# Patient Record
Sex: MALE | Race: WHITE | NOT HISPANIC OR LATINO | URBAN - METROPOLITAN AREA
[De-identification: names, ages, dates, MRNs, and addresses within clinical notes are randomized per-mention and may not be internally consistent; named-entity substitution may affect disease eponyms.]

---

## 2017-05-09 ENCOUNTER — RECORDS - HEALTHEAST (OUTPATIENT)
Dept: LAB | Facility: CLINIC | Age: 32
End: 2017-05-09

## 2017-05-09 LAB
CHOLEST SERPL-MCNC: 189 MG/DL
FASTING STATUS PATIENT QL REPORTED: ABNORMAL
HDLC SERPL-MCNC: 40 MG/DL
LDLC SERPL CALC-MCNC: 130 MG/DL
TRIGL SERPL-MCNC: 97 MG/DL

## 2017-11-09 ENCOUNTER — RECORDS - HEALTHEAST (OUTPATIENT)
Dept: ADMINISTRATIVE | Facility: OTHER | Age: 32
End: 2017-11-09

## 2017-11-09 ENCOUNTER — COMMUNICATION - HEALTHEAST (OUTPATIENT)
Dept: SURGERY | Facility: CLINIC | Age: 32
End: 2017-11-09

## 2017-11-10 ENCOUNTER — AMBULATORY - HEALTHEAST (OUTPATIENT)
Dept: SURGERY | Facility: CLINIC | Age: 32
End: 2017-11-10

## 2017-11-10 DIAGNOSIS — E66.01 MORBID OBESITY (H): ICD-10-CM

## 2017-12-20 ENCOUNTER — OFFICE VISIT - HEALTHEAST (OUTPATIENT)
Dept: SURGERY | Facility: CLINIC | Age: 32
End: 2017-12-20

## 2017-12-20 ENCOUNTER — AMBULATORY - HEALTHEAST (OUTPATIENT)
Dept: LAB | Facility: CLINIC | Age: 32
End: 2017-12-20

## 2017-12-20 DIAGNOSIS — E66.01 OBESITY, CLASS III, BMI 40-49.9 (MORBID OBESITY) (H): ICD-10-CM

## 2017-12-20 DIAGNOSIS — R53.83 FATIGUE: ICD-10-CM

## 2017-12-20 DIAGNOSIS — K76.0 HEPATIC STEATOSIS: ICD-10-CM

## 2017-12-20 LAB — HBA1C MFR BLD: 5.4 % (ref 3.5–6)

## 2017-12-20 ASSESSMENT — MIFFLIN-ST. JEOR: SCORE: 2660.94

## 2018-01-30 ENCOUNTER — OFFICE VISIT - HEALTHEAST (OUTPATIENT)
Dept: SURGERY | Facility: CLINIC | Age: 33
End: 2018-01-30

## 2018-01-30 DIAGNOSIS — Z71.3 NUTRITIONAL COUNSELING: ICD-10-CM

## 2018-01-30 DIAGNOSIS — E66.01 OBESITY, MORBID, BMI 40.0-49.9 (H): ICD-10-CM

## 2018-01-30 ASSESSMENT — MIFFLIN-ST. JEOR: SCORE: 2600.02

## 2018-02-09 ENCOUNTER — OFFICE VISIT - HEALTHEAST (OUTPATIENT)
Dept: SURGERY | Facility: CLINIC | Age: 33
End: 2018-02-09

## 2018-02-09 DIAGNOSIS — K76.0 HEPATIC STEATOSIS: ICD-10-CM

## 2018-02-09 DIAGNOSIS — E66.01 OBESITY, CLASS III, BMI 40-49.9 (MORBID OBESITY) (H): ICD-10-CM

## 2018-02-09 DIAGNOSIS — G47.33 OSA ON CPAP: ICD-10-CM

## 2018-02-09 RX ORDER — ZOLPIDEM TARTRATE 10 MG/1
TABLET ORAL PRN
Refills: 1 | Status: SHIPPED | COMMUNITY
Start: 2018-01-15

## 2018-02-09 ASSESSMENT — MIFFLIN-ST. JEOR: SCORE: 2605.92

## 2018-03-06 ENCOUNTER — OFFICE VISIT - HEALTHEAST (OUTPATIENT)
Dept: SURGERY | Facility: CLINIC | Age: 33
End: 2018-03-06

## 2018-03-06 DIAGNOSIS — Z71.3 NUTRITIONAL COUNSELING: ICD-10-CM

## 2018-03-06 DIAGNOSIS — E66.01 OBESITY, MORBID, BMI 40.0-49.9 (H): ICD-10-CM

## 2018-03-06 ASSESSMENT — MIFFLIN-ST. JEOR: SCORE: 2565.09

## 2018-03-30 ENCOUNTER — COMMUNICATION - HEALTHEAST (OUTPATIENT)
Dept: SURGERY | Facility: CLINIC | Age: 33
End: 2018-03-30

## 2018-04-27 ENCOUNTER — COMMUNICATION - HEALTHEAST (OUTPATIENT)
Dept: SURGERY | Facility: CLINIC | Age: 33
End: 2018-04-27

## 2018-05-08 ENCOUNTER — RECORDS - HEALTHEAST (OUTPATIENT)
Dept: LAB | Facility: CLINIC | Age: 33
End: 2018-05-08

## 2018-05-08 LAB
ALBUMIN SERPL-MCNC: 3.8 G/DL (ref 3.5–5)
ALP SERPL-CCNC: 57 U/L (ref 45–120)
ALT SERPL W P-5'-P-CCNC: 41 U/L (ref 0–45)
AST SERPL W P-5'-P-CCNC: 30 U/L (ref 0–40)
BILIRUB DIRECT SERPL-MCNC: 0.3 MG/DL
BILIRUB SERPL-MCNC: 1.2 MG/DL (ref 0–1)
PROT SERPL-MCNC: 7.4 G/DL (ref 6–8)

## 2018-09-12 ENCOUNTER — OFFICE VISIT - HEALTHEAST (OUTPATIENT)
Dept: SURGERY | Facility: CLINIC | Age: 33
End: 2018-09-12

## 2018-09-12 DIAGNOSIS — E66.01 OBESITY, CLASS III, BMI 40-49.9 (MORBID OBESITY) (H): ICD-10-CM

## 2018-09-12 DIAGNOSIS — G25.81 RESTLESS LEGS SYNDROME: ICD-10-CM

## 2018-09-12 DIAGNOSIS — G47.33 OSA ON CPAP: ICD-10-CM

## 2018-09-12 DIAGNOSIS — K76.0 HEPATIC STEATOSIS: ICD-10-CM

## 2018-09-12 RX ORDER — GABAPENTIN 300 MG/1
3 CAPSULE ORAL AT BEDTIME
Status: SHIPPED | COMMUNITY
Start: 2018-06-25

## 2018-09-12 ASSESSMENT — MIFFLIN-ST. JEOR: SCORE: 2600.02

## 2018-09-27 ENCOUNTER — OFFICE VISIT - HEALTHEAST (OUTPATIENT)
Dept: SURGERY | Facility: CLINIC | Age: 33
End: 2018-09-27

## 2018-09-27 DIAGNOSIS — Z71.3 NUTRITIONAL COUNSELING: ICD-10-CM

## 2018-09-27 DIAGNOSIS — G47.33 OSA ON CPAP: ICD-10-CM

## 2018-09-27 DIAGNOSIS — E66.01 OBESITY, MORBID, BMI 40.0-49.9 (H): ICD-10-CM

## 2018-09-27 ASSESSMENT — MIFFLIN-ST. JEOR: SCORE: 2613.63

## 2018-10-31 ENCOUNTER — OFFICE VISIT - HEALTHEAST (OUTPATIENT)
Dept: SURGERY | Facility: CLINIC | Age: 33
End: 2018-10-31

## 2018-10-31 DIAGNOSIS — F17.200 NICOTINE DEPENDENCE: ICD-10-CM

## 2018-10-31 DIAGNOSIS — K76.0 HEPATIC STEATOSIS: ICD-10-CM

## 2018-10-31 DIAGNOSIS — G47.33 OSA ON CPAP: ICD-10-CM

## 2018-10-31 DIAGNOSIS — E66.01 OBESITY, CLASS III, BMI 40-49.9 (MORBID OBESITY) (H): ICD-10-CM

## 2018-10-31 ASSESSMENT — MIFFLIN-ST. JEOR: SCORE: 2596.39

## 2018-11-05 ENCOUNTER — RECORDS - HEALTHEAST (OUTPATIENT)
Dept: LAB | Facility: CLINIC | Age: 33
End: 2018-11-05

## 2018-11-05 LAB
ANION GAP SERPL CALCULATED.3IONS-SCNC: 9 MMOL/L (ref 5–18)
BUN SERPL-MCNC: 9 MG/DL (ref 8–22)
CALCIUM SERPL-MCNC: 9.5 MG/DL (ref 8.5–10.5)
CHLORIDE BLD-SCNC: 105 MMOL/L (ref 98–107)
CO2 SERPL-SCNC: 26 MMOL/L (ref 22–31)
CREAT SERPL-MCNC: 0.72 MG/DL (ref 0.7–1.3)
GFR SERPL CREATININE-BSD FRML MDRD: >60 ML/MIN/1.73M2
GLUCOSE BLD-MCNC: 78 MG/DL (ref 70–125)
MAGNESIUM SERPL-MCNC: 2.3 MG/DL (ref 1.8–2.6)
POTASSIUM BLD-SCNC: 4.2 MMOL/L (ref 3.5–5)
SODIUM SERPL-SCNC: 140 MMOL/L (ref 136–145)

## 2018-11-14 ENCOUNTER — COMMUNICATION - HEALTHEAST (OUTPATIENT)
Dept: SURGERY | Facility: CLINIC | Age: 33
End: 2018-11-14

## 2019-01-18 ENCOUNTER — OFFICE VISIT - HEALTHEAST (OUTPATIENT)
Dept: SURGERY | Facility: CLINIC | Age: 34
End: 2019-01-18

## 2019-01-18 DIAGNOSIS — E66.01 OBESITY, CLASS III, BMI 40-49.9 (MORBID OBESITY) (H): ICD-10-CM

## 2019-01-18 DIAGNOSIS — K76.0 HEPATIC STEATOSIS: ICD-10-CM

## 2019-01-18 DIAGNOSIS — G47.33 OSA ON CPAP: ICD-10-CM

## 2019-01-18 ASSESSMENT — MIFFLIN-ST. JEOR: SCORE: 2544.68

## 2019-04-04 ENCOUNTER — COMMUNICATION - HEALTHEAST (OUTPATIENT)
Dept: SURGERY | Facility: CLINIC | Age: 34
End: 2019-04-04

## 2019-09-26 ENCOUNTER — AMBULATORY - HEALTHEAST (OUTPATIENT)
Dept: NURSING | Facility: CLINIC | Age: 34
End: 2019-09-26

## 2019-10-28 ENCOUNTER — COMMUNICATION - HEALTHEAST (OUTPATIENT)
Dept: SURGERY | Facility: CLINIC | Age: 34
End: 2019-10-28

## 2019-11-01 ENCOUNTER — OFFICE VISIT - HEALTHEAST (OUTPATIENT)
Dept: SURGERY | Facility: CLINIC | Age: 34
End: 2019-11-01

## 2019-11-01 DIAGNOSIS — G47.33 OSA ON CPAP: ICD-10-CM

## 2019-11-01 DIAGNOSIS — E66.01 OBESITY, CLASS III, BMI 40-49.9 (MORBID OBESITY) (H): ICD-10-CM

## 2019-11-01 RX ORDER — PHENTERMINE HYDROCHLORIDE 37.5 MG/1
TABLET ORAL
Qty: 90 TABLET | Refills: 0 | Status: SHIPPED | OUTPATIENT
Start: 2019-11-01

## 2019-11-01 ASSESSMENT — MIFFLIN-ST. JEOR: SCORE: 2627.24

## 2019-12-03 ENCOUNTER — RECORDS - HEALTHEAST (OUTPATIENT)
Dept: LAB | Facility: CLINIC | Age: 34
End: 2019-12-03

## 2019-12-03 LAB
ALBUMIN SERPL-MCNC: 3.4 G/DL (ref 3.5–5)
ALP SERPL-CCNC: 62 U/L (ref 45–120)
ALT SERPL W P-5'-P-CCNC: 70 U/L (ref 0–45)
ANION GAP SERPL CALCULATED.3IONS-SCNC: 7 MMOL/L (ref 5–18)
AST SERPL W P-5'-P-CCNC: 64 U/L (ref 0–40)
BILIRUB SERPL-MCNC: 1.2 MG/DL (ref 0–1)
BUN SERPL-MCNC: 8 MG/DL (ref 8–22)
CALCIUM SERPL-MCNC: 8.2 MG/DL (ref 8.5–10.5)
CHLORIDE BLD-SCNC: 105 MMOL/L (ref 98–107)
CO2 SERPL-SCNC: 27 MMOL/L (ref 22–31)
CREAT SERPL-MCNC: 0.75 MG/DL (ref 0.7–1.3)
GFR SERPL CREATININE-BSD FRML MDRD: >60 ML/MIN/1.73M2
GLUCOSE BLD-MCNC: 91 MG/DL (ref 70–125)
LIPASE SERPL-CCNC: 36 U/L (ref 0–52)
POTASSIUM BLD-SCNC: 3.8 MMOL/L (ref 3.5–5)
PROT SERPL-MCNC: 6.9 G/DL (ref 6–8)
SODIUM SERPL-SCNC: 139 MMOL/L (ref 136–145)

## 2019-12-17 ENCOUNTER — RECORDS - HEALTHEAST (OUTPATIENT)
Dept: LAB | Facility: CLINIC | Age: 34
End: 2019-12-17

## 2019-12-17 LAB
ALBUMIN SERPL-MCNC: 3.9 G/DL (ref 3.5–5)
ALP SERPL-CCNC: 54 U/L (ref 45–120)
ALT SERPL W P-5'-P-CCNC: 70 U/L (ref 0–45)
ANION GAP SERPL CALCULATED.3IONS-SCNC: 7 MMOL/L (ref 5–18)
AST SERPL W P-5'-P-CCNC: 56 U/L (ref 0–40)
BILIRUB SERPL-MCNC: 1.1 MG/DL (ref 0–1)
BUN SERPL-MCNC: 10 MG/DL (ref 8–22)
CALCIUM SERPL-MCNC: 8.6 MG/DL (ref 8.5–10.5)
CHLORIDE BLD-SCNC: 107 MMOL/L (ref 98–107)
CO2 SERPL-SCNC: 26 MMOL/L (ref 22–31)
CREAT SERPL-MCNC: 0.78 MG/DL (ref 0.7–1.3)
GFR SERPL CREATININE-BSD FRML MDRD: >60 ML/MIN/1.73M2
GLUCOSE BLD-MCNC: 93 MG/DL (ref 70–125)
POTASSIUM BLD-SCNC: 4.2 MMOL/L (ref 3.5–5)
PROT SERPL-MCNC: 7.5 G/DL (ref 6–8)
SODIUM SERPL-SCNC: 140 MMOL/L (ref 136–145)

## 2021-01-29 ENCOUNTER — RECORDS - HEALTHEAST (OUTPATIENT)
Dept: LAB | Facility: CLINIC | Age: 36
End: 2021-01-29

## 2021-01-29 LAB
ALBUMIN SERPL-MCNC: 4.2 G/DL (ref 3.5–5)
ALP SERPL-CCNC: 54 U/L (ref 45–120)
ALT SERPL W P-5'-P-CCNC: 42 U/L (ref 0–45)
ANION GAP SERPL CALCULATED.3IONS-SCNC: 8 MMOL/L (ref 5–18)
AST SERPL W P-5'-P-CCNC: 28 U/L (ref 0–40)
BILIRUB SERPL-MCNC: 0.8 MG/DL (ref 0–1)
BUN SERPL-MCNC: 11 MG/DL (ref 8–22)
CALCIUM SERPL-MCNC: 8.8 MG/DL (ref 8.5–10.5)
CHLORIDE BLD-SCNC: 107 MMOL/L (ref 98–107)
CHOLEST SERPL-MCNC: 184 MG/DL
CO2 SERPL-SCNC: 26 MMOL/L (ref 22–31)
CREAT SERPL-MCNC: 0.79 MG/DL (ref 0.7–1.3)
FASTING STATUS PATIENT QL REPORTED: YES
GFR SERPL CREATININE-BSD FRML MDRD: >60 ML/MIN/1.73M2
GLUCOSE BLD-MCNC: 94 MG/DL (ref 70–125)
HDLC SERPL-MCNC: 36 MG/DL
LDLC SERPL CALC-MCNC: 125 MG/DL
POTASSIUM BLD-SCNC: 4.4 MMOL/L (ref 3.5–5)
PROT SERPL-MCNC: 7.4 G/DL (ref 6–8)
SODIUM SERPL-SCNC: 141 MMOL/L (ref 136–145)
TRIGL SERPL-MCNC: 117 MG/DL

## 2021-05-31 VITALS — BODY MASS INDEX: 40.43 KG/M2 | HEIGHT: 74 IN | WEIGHT: 315 LBS

## 2021-05-31 VITALS — HEIGHT: 74 IN | WEIGHT: 315 LBS | BODY MASS INDEX: 40.43 KG/M2

## 2021-06-01 VITALS — WEIGHT: 315 LBS | HEIGHT: 74 IN | BODY MASS INDEX: 40.43 KG/M2

## 2021-06-01 VITALS — HEIGHT: 74 IN | BODY MASS INDEX: 40.43 KG/M2 | WEIGHT: 315 LBS

## 2021-06-02 VITALS — WEIGHT: 315 LBS | BODY MASS INDEX: 40.43 KG/M2 | HEIGHT: 74 IN

## 2021-06-02 VITALS — HEIGHT: 74 IN | WEIGHT: 315 LBS | BODY MASS INDEX: 40.43 KG/M2

## 2021-06-02 VITALS — BODY MASS INDEX: 40.43 KG/M2 | HEIGHT: 74 IN | WEIGHT: 315 LBS

## 2021-06-02 NOTE — PROGRESS NOTES
"Bariatric Clinic Follow-Up Visit:    Marv uGy is a 34 y.o.  male with There is no height or weight on file to calculate BMI.  presenting here today for follow-up on non-surgical efforts for weight loss. Original Intake visit occurred on 9/12/18 with a weight of 354 lbs and BMI of 45.5.  Along with diet and behavior changes, he has been using phentermine in the past to assist his weight loss goals.  See his intake visit notes for details on identified contributors to weight gain in the past.  He's last been seen about 10 months ago (was due to have a new child in March) in January and requested a refill of phentermine recently but since it'd been over 6 months from our last visit is following up today for reassessment and possible re-initiation of medication. He's been using CPAP in the past for JARROD, wasn't interested in surgery in the past, had low vitamin D levels.  He'd had a RMR of 2622kcal and protein goal of 95-170g/day of protein.  Weight reduction was further of interest due to history of fatty liver disease with previous transaminase elevations in 2017 (improved last check in May 2018).    Weight:   Wt Readings from Last 2 Encounters:   01/18/19 (!) 342 lb 12.8 oz (155.5 kg)   10/31/18 (!) 354 lb 3.2 oz (160.7 kg)    pounds  Height: 6' 2\" (1.88 m) (1/18/2019  1:45 PM)  Weight: (!) 342 lb 12.8 oz (155.5 kg) (1/18/2019  1:45 PM)  BMI (Calculated): 44 (1/18/2019  1:45 PM)  SpO2: 95 % (1/18/2019  1:45 PM)      Comorbidities:  Patient Active Problem List   Diagnosis     JARROD on CPAP     Hepatic steatosis     Obesity, Class III, BMI 40-49.9 (morbid obesity) (H)       Current Outpatient Medications:      gabapentin (NEURONTIN) 300 MG capsule, Take 3 capsules by mouth at bedtime., Disp: , Rfl:      phentermine (ADIPEX-P) 37.5 mg tablet, Half tablet daily, Disp: 60 tablet, Rfl: 0     zolpidem (AMBIEN) 10 mg tablet, as needed., Disp: , Rfl: 1      Interim: Since our last visit, he has gained substantial weight " with the birth of his daughter last spring. Was hoping to get back on phentermine. Feels life will allow for some extra personal care now. Tolerated phentermine well in the past. Discussed side effects to watch for/proper use.CPAP use is good. Ramping up D3 again. RMR of 2622kcal reviewed w/ protein goal of about 130g/day.     Plan:   1.  . Welcome back and congratulations on the child. To get the weight back down, Aiming for 120 grams of protein daily, hydrating well with 100-130 oz daily. Meals timing about every 5 hours and moving as much as you can. 10-20 minutes 3-4 days weekly is great. Interval training/intensity helps. Allow 2-3 weeks of base fitness development first to avoid injury.  2. Phentermine refilled (see below). Don't use if ill or using other stimulants/cold medications (Sudafed/dayquil).   3. Recommend 8860-8778 IUs daily on average.  4. Continue cpap.   5. Goal of 330lbs initially and under 300 lbs longer term. Currently not interested in surgical program.     We discussed [facility]ariat[facility] Basics including:  -eating 3 meals daily  -eating protein first  -eating slowly, chewing food well  -avoiding/limiting calorie containing beverages  -We discussed the importance of restorative sleep and stress management in maintaining a healthy weight.  -We discussed the National Weight Control Registry healthy weight maintenance strategies and ways to optimize metabolism.  -We discussed the importance of physical activity including cardiovascular and strength training in maintaining a healthier weight and explored viable options.    Most recent labs:  Lab Results   Component Value Date    WBC 5.7 10/25/2016    HGB 16.4 10/25/2016    HCT 45.7 10/25/2016    MCV 91 10/25/2016     10/25/2016     Lab Results   Component Value Date    CHOL 189 05/09/2017     Lab Results   Component Value Date    HDL 40 05/09/2017     Lab Results   Component Value Date    LDLCALC 130 (H) 05/09/2017     Lab Results  "  Component Value Date    TRIG 97 05/09/2017     No components found for: CHOLHDL  Lab Results   Component Value Date    ALT 41 05/08/2018    AST 30 05/08/2018    ALKPHOS 57 05/08/2018    BILITOT 1.2 (H) 05/08/2018     Lab Results   Component Value Date    HGBA1C 5.4 12/20/2017     Lab Results   Component Value Date    TIGDEEHH09 591 12/20/2017     Lab Results   Component Value Date    TLRTZIXG25EY 23.3 (L) 12/20/2017     Lab Results   Component Value Date    FERRITIN 177 07/28/2014     No results found for: PTH  No results found for: 17233  No results found for: 7597  Lab Results   Component Value Date    TSH 2.22 10/25/2016     No results found for: TESTOSTERONE    DIETARY HISTORY    Positive Changes Since Last Visit: enjoying fatherhood  Struggling With: time constraints of parenthood/eating reactively and loss of activity        PHYSICAL ACTIVITY PATTERNS:  Cardiovascular: walks  Strength Training: weights at home    REVIEW OF SYSTEMS  GENERAL/CONSTITUTIONAL:  nl  HEENT:   nl  CARDIOVASCULAR:   nl  PULMONARY:   nl  GASTROINTESTINAL:  nl  UROLOGIC:  nl  NEUROLOGIC:  nl  PSYCHIATRIC:  nl  MUSCULOSKELETAL/RHEUMATOLOGIC   nl  ENDOCRINE:  nl  DERMATOLOGIC:  nl    PHYSICAL EXAM:  Vitals: There were no vitals taken for this visit.  Height: 6' 2\" (1.88 m) (1/18/2019  1:45 PM)  Weight: (!) 342 lb 12.8 oz (155.5 kg) (1/18/2019  1:45 PM)  BMI (Calculated): 44 (1/18/2019  1:45 PM)  SpO2: 95 % (1/18/2019  1:45 PM)      GEN: Pleasant, well groomed, in no acute distress  HEENT: PEERL, EOMI, airway clear .  NECK: No swelling.  HEART: Rhythm regular, rate regular, no murmur   LUNGS: Clear without crackles or wheezes. No cough.  ABDOMEN: obese, non tender..  EXTREMITIES:  2plus palpable peripheral pulses, radial.  NEURO: Alert and Oriented X3, normal gait and speech.  SKIN: No visible rashes.        25 minutes was spent in direct consultation, with over 50% of it spent in counseling regarding their plan for excess weight " reduction and health modification.  Max Cameron MD  [facility]  12:26 PM

## 2021-06-02 NOTE — PATIENT INSTRUCTIONS - HE
"Plan:  1. Welcome back and congratulations on the child. To get the weight back down, Aiming for 120 grams of protein daily, hydrating well with 100-130 oz daily. Meals timing about every 5 hours and moving as much as you can. 10-20 minutes 3-4 days weekly is great. Interval training/intensity helps. Allow 2-3 weeks of base fitness development first to avoid injury.  2. Phentermine refilled (see below). Don't use if ill or using other stimulants/cold medications (Sudafed/dayquil).   3. Recommend 7464-8630 IUs daily on average.  4. Continue cpap.         What makes a person succeed with dramatic and sustained weight loss?    It's being at the right point in your life where you feel the need to lose the weight, not because anyone told you so but because of a voice inside of you that says, \"I am ready for this\".  You're now at a point where you may be feeling anxious, irritable and when you look in the mirror you do not recognize the person looking back.  Your true self is buried somewhere in that reflexion and you want to free it again.  This is the sort of motivation that leads to success, and it comes from you.    Because the only person that can lose that extra weight is you, I like my patients to focus on the mindset of being in Weight Loss Season.  This gives you permission to make the changes necessary to be consistent with the diet/activity and behavior changes that lead to successful, healthy weight loss.  Nearly any diet plan can work for weight loss, but keeping it healthy and nutrient based to prevent deficiencies/hair loss/fatigue or irritability is vital.  If you have a plan you want to try, we'll work with you to make sure no adjustments are needed to keep you healthy through your weight loss season and working with our Bariatric Nutritionists you'll be given expert guidance to customize your diet plan to suit your particular needs. If you don't have your own ideas in mind, we are always happy to suggest " "well researched and validated plans that provide enough food to prevent hunger but still tap into your excess fat reserves and lose weight in a sustainable fashion.  There is great evidence that lean protein/healthy fat intake with good fiber intake while minimizing simple starches/carbs produces reliable/sustainable weight loss in most people. But some feel more connected to an intermittent fasting/fast mimicking or ketogenic diet.  These protocols can be hard for many to stick with and that's why we prefer the protein/healthy fat focused diets but if these alternative strategies appeal to you, we can work with you to optimize your knowledge and results with these tools.    Losing weight is a temporary commitment, but you need to be \"All In\" to have a good weight loss season.  To avoid frustration, you have to be willing to be nearly perfect 19 out of 20 days or even better than that. But, weight loss season is generally only 4-8 months in length. After that length of time, it can be hard to maintain a negative calorie balance and our brain, motivations and metabolism will usually bring you to a plateau that cannot be broken in this modern world where other commitments start to take priority. That's when we look to stabilize the weight loss you've achieved.  If you've reached your goal by that time, fantastic, and job well done.  If there is more to go, then after a few months of stabilization, we can usually attack that previous plateau and break into new territory.    Because of this time limitation, we want to really get to work right away and get into a sustainable routine ASAP.  One of the best predictors of how much weight you're going to lose throughout the season is how much you lose in the first 6 weeks, so prepare well and jump in with both feet.      Occasionally, people may feel like they cannot commit fully to the changes necessary and may want to change one thing at a time and \"get used to\" the idea " "of losing weight.  That is OK because that is where they are in their life, and they cannot fully commit for any number of reasons.  It's part of that internal motivation and they just haven't reached PRIORTY NUMBER ONE status yet. It's possible that what they need is more time to reach that point and I am always willing to work with people that want to \"dabble\", but understand, the amount of success obtained with half measures, is much less than half results. But Behavior Change cannot occur until a person goes through the contemplation and preparation phases necessary to have successful action and we are more than willing to give you targets to move along the spectrum and get to that point where you feel ready to  commit fully to the weight loss season.      As you go through your plan, look for things to keep your motivation rolling.  The most successful people have a goal or target/reward that they are working towards.  Having a reward that celebrates your new fitness, mobility and energy is the best sort because it will encourage you to do well with the weight maintenance phase and long term lifestyle changes that promotes keeping the weight off for the long term.  Usually, \"getting healthier\" or improving blood tests or losing weight so your clothes fit better is not as internally motivating as having a tangible reward.  A more motivating reward is one that isn't food based, is affordable, but something special:  Something you won't be getting unless you achieve your goal.   It s important to keep to the rule of success:  in order to get the reward, your goal MUST be achieved. Write this reward down, where you can look at it daily and keep it in the front of your mind as you go through your weight loss season and it will help keep you on track.    Tools that help change behavior are vital for success. The most studied and most supported tool for weight loss is nothing more than writing down your food and " weight every day.  Every Day.  Accurately and completely.  When you commit to weight loss season, this information tells you whether you're getting ENOUGH food to fuel your weight loss properly as well as teaches you the interaction between different foods you eat and how your body responds with weight loss.  You'll see that sometimes after a heavy workout you don't see the scale move until 2-3 days later.  How saltier meals (chili for example) may make you retain water for 4-5 days before you see the weight come off, you'll get used to the mini-plateaus that develop after a good 3-8 lb drop in weight as well as how you break through if you keep working the diet as you should.    Weight loss is not a linear process, there are mini ups and downs.  Learning how your body loses weight and getting comfortable with that is very rewarding. The act of writing words on paper also solidifies your will power and commitment to the season of weight loss and that by itself changes your brain chemistry/appetite, motivation and prepares you for maximal success.      Behavior change is all about getting into a new routine.  The old habits and routine have to change because without changing the circumstances of how you gained your weight, it's unlikely you'll enjoy satisfying results. If you have snacking habits, like every time you walk through the kitchen you grab a little something, well, that habit has to change and be replaced by a new habit.  It can be something as simple as keeping a doodle pad on the counter that you make a few scribbles and then walk through the kitchen having not opened the cupboard, or starting with a glass of water and leaving the kitchen without anything else, or checking your food journal to see how many calories you have left for the day.  Boredom is the enemy as are the old habits. Break new ground and try to push those old habits into a deep hole.      Finally, exercise always helps.  While not  "mandatory to lose weight, every little bit helps and exercise has so many other benefits that to not work it into your plan is to miss out on all the mood, sleep, stress and general health benefits that come from making yourself a little short of breath and sweaty at least 3-4 days out of the week.  The metabolism and calorie burn benefits aside, almost every chronic ailment in medicine gets better with proper, aerobic exercise.  Allow yourself to start slow and let your body prepare itself to accept harder training 4-6 weeks down the road, but start now and commit to a plan.  Whether you have the means to hire a , join a gym or just walk out your front door or go down to your basement for a video workout, get into a exercise routine and  after 3 weeks of at least 3 times a week exercise you should be at a point where you can slowly start ramping up 10% each week to our goal of at least 150-300minutes weekly of aerobic exercise and at least twice weekly resistance training/strenghtening with weights/bands or body weight exercises.     I am a big fan of modifying the free training plan, \"Couch to 5k\" for almost all of my patients. Just type it into [employer] or look it up on your smart phone k[employer] store.  To modify the plan,  you can use the training plan for whatever aerobic activity you do (bike/treadmill/elliptical/rower/pool/etc). During the \"jog\" intervals you just move a little faster or harder, or increase the tension or incline.  You use those little intervals to switch up the workout and recruit more muscles and pump the blood a little more and then recover again in the \"walk\" intervals by slowing down, decreasing the incline or turning down the tension.  3-4 days a week is not that much to ask and the benefits are enormous.  Start slow and develop the base from which you can then build on and reduce the risk of injury.  It's much more important 2-4 months from now to be enjoying your exercise then it is " "to over exert yourself at the start and hurting yourself.  Starting slowly allows your body to accept the training better down the road when the exercise becomes crucial for weight maintenance.  Without exercise down the road during your maintenance phase, all this hard work you are about to put in can be undone. It usually takes about 100-300 calories a day of exercise to maintain a weight loss and our focus during weight loss season is to generate the routine/activities and hobbies that make that enjoyable/sustainable.    Thanks for taking this first and most important step in your weight loss season.  Commit to it and we will cheerlead you all the way to success.  When things get tough or off track we'll offer guidance and analysis and when you reach your goal we'll celebrate your success.  In the end, it is all about your success and what you do with it.      Max Cameron MD  [facility]  [phone]      Weight Loss Shopping list:    Having the proper food on hand and ready to go is very important in losing weight.  Everybody has their own preferences/tastes so modify this list as you need but the following are foods that have been found to be helpful in following a calorie restricted diet.   If you are a person that doesn't \"like to eat my vegetables\", I recommend making smoothies and throwing the veggies into the  with some fruit and protein powder.      Fruits:  Generally limit to 2-3 whole fruits a day.  Do not buy Juice.  We depend on the fiber and chewing to slow us down and get phytonutrients/antioxidants available in the skins of fruits for health benefits.  Chewing also signals our stomach to send less hunger signals to our brains:    Apples (1-2 daily), Bananas (no more than one full banana a day), Grapes (2 handfuls a day), Clementines/oranges (one daily), berries (blue/rasp/straw:  2 handfuls a day). Watermelon (cubed for easy access, small " "bowl).    Vegetables:  Eating raw gets most nutrient and fiber benefits but cooked veggies are fine as well, using frozen for cooking or in smoothies is fine as well.  The more chewing/crunchiness the better the hunger control.  No limit to how many a day, but you should at least get 4 handfuls a day or more minimum.  Wash and cut up your vegetables into easy to carry, on the go sizes that are easy to put in plastic bags/pyrex and carry to work/school/etc.  Frozen veggies are just fine as well.     Broccoli, Carrots (no more than 3 a day large carrots or 2 handfuls of baby carrots, as they are very sweet), celery, cucumbers, green peppers, green beans (canned or fresh/frozen), Lettuce(all types), Beets(for roasting, will likely turn urine and stool a bit purple), asparagus.  Go crazy with the veggies, just wash well prior to eating.    Protein:  Women need at least  grams of protein a day and men at least  grams a day, more is fine.  Protein is our \"brain food\" and hunger suppressor and getting enough ensures maintenance of muscle mass during weight loss.  Vegetarians should look to balance their protein intake to get all essential amino acids and discuss with dietician if help is needed (mix of beans/soy/etc).  Protein powders or drinks count and can be a great way to control appetite during the day and easy to grab and go/carry to work/etc.  Premier Protein, BiPro, TarasWhey are all brands with high quality whey protein. For whey sensitive people, rice protein is an option as well.    Canned tuna/sardines or anchovies.  Fresh fish/salmon the day you plan to make it.  Chicken breasts/thighs (skinless while losing weight), filet mignon steak (leaner but ) or marinade sirloin (wipe off before cooking). Eggs cooked in olive oil for breakfast is a good way to get protein and good fat in the a.m. (cook on low heat to prevent transformation of olive oil into less healthy fat).  Greek Yogurt with " at least 20 grams of protein per serving and less than 11 grams of carbs/sugars.  String Cheese  Cottage Cheese: 2% or fat free per your preference.            Example Meal Plan for a 1086-1181 Calorie Diet: Add 33% more to portions.    In order to fuel your weight loss properly and avoid hunger-induced overeating later in the day, you will enjoy the most success by following the diet below or similar with adjustments based on your particular tastes and preferences.      I recommend getting into a meal routine and keeping it similar day to day in the beginning so you don t have to think too hard about what you re going to make/eat.  Keep snacks healthy, ideally containing protein.  Non-processed food is preferable to packaged items.  Eat at least a few crunchy green vegetables at snack times, which should be 2-3 hours after your mealtimes(prepare these ahead of time) once or twice daily if very active.  Drink 64 oz -90 oz of water daily.  During a diet we often mistake thirst for hunger or just have some grazing habits we have to break ('bored/mindless eating') and a glass of water and reconsideration of our hunger is often all that is needed.      If you re having hunger problems, add a protein drink to your morning hours or afternoon snack with at least 20grams of protein and not too much sugar.  A carton of higher protein/low sugar yogurt can work as well.  If the urge to snack is overwhelming and not satiated, try going for a 10 minute walk/exercise, come home and drink a glass of water and if still hungry, have a  calorie snack (handful of raw/sprouted nuts, veggies and string cheese, protein bar, etc).      It is better to have a large breakfast, a moderate lunch and a smaller dinner to fuel your day.  People lose 10-15% more weight during their weight loss season with this strategy.    To make sure you re getting adequate vitamins and minerals during weight loss, I recommend one complete multivitamin a  day of your choice.  Consider a probiotic and taking some vitamin D 2000 IU daily.    Let supper be your last meal of the day and ideally try to have at least 12 hours between supper and breakfast the next day to tap into some beneficial overnight fasting dynamics.  Water in the evening is fine, unsweetened herbal teas as well.  Consolidating your meals within a 8-12 hour period of your day will help tap into these additional metabolic benefits and tends to keep your appetite up for breakfast, further helping to stay on track.  For most of my patients I don't recommend an intermittent fasting style diet (many find it hard to fit in their lifestyle) but an overnight fast is very doable for most patients and helps regulate our hunger drives a little better.  This makes it very important to nail good intake at all three meals to feel satisfied/energized and still lose weight.      Example Meal Plan:  Breakfast: 450-475 Calories  1 egg cooked on low in olive oil:   calories.  5oz Greek Yogurt (Fage plain classic: ~150 renate)  Handful of Berries of your choice (about a calorie per berry or 20-40cal per handful)    cup(cooked) of  old fashioned oatmeal or 1/2 cup(cooked) steel cut oats. (150 renate)  Sprinkle amount of brown sugar and a pat of butter. (40 renate)  Glass of  Water  Black coffee or unsweetened Tea (0calories).      2-3 hours Later Snack: (195 calories).  Glass of water  One string Cheese (80 calories) or 4 oz creamed cottage cheese (115 calories) with  Crunchy Celery sticks (less than 10 calories per large stalk) 2 stalks. (20 calories)    of a  Large Banana or   of a Large Apple (60 calories):  eat second half at lunch or afternoon snack.     Lunch:300 -350 calories   Chicken Breast  (baked/broiled/roasted/grilled)  4-6 oz.  (125-180 renate), BBQ sauce/hot sauce/mustard/seasoning is free. Just use a reasonable amount. Or a can of tuna with 1 tablespoon mayonnaise.  Salad: lettuce, any other veggies (cucumbers,  green peppers/celery you like and a small drizzle of dressing to just flavor.  Go as big on the veggies as you like,  as they are practically calorie free.   A whole, 8 inch cucumber is 45 calories, a whole green pepper is 23 calories, a stalk of celery is 9 calories.  [place] Dressing is 60 calories per tablespoon..so moderate your desired dressing or do a drizzle of olive oil and splash of balsamic vinegar on top,  Total calories unlikely to be over 150 even with dressing.  Glass of Water.    Option for lunch is meal replacement protein drink/smoothie.  Need at least 20 grams of protein and eat the rest of your apple/banana from the morning snack.      Afternoon Snack: 150-200 calories   Cheese Stick or cottage cheese again  and a fresh fruit OR  Granola Bar (protein Bar acceptable if under 200 calories OR  Homemade smoothies:  8oz skim milk,  a handful of berries (fresh or frozen and a serving of protein powder such as BiPro or Ella sWhey for example.  If you don't like dairy, make with 8oz water, one small banana, handful of berries and the protein powder, add any veggies you want as well:  roughly 200 calories.   Glass of Water    Dinner: 325 calories  4oz of fresh, Atlantic salmon.  Broiled (salt/pepper/dill) for about 8-8.5 minutes (200calories) or  4oz filet mignon steak or sirloin steak  Salad or vegetable sautéed lightly in olive oil or   Broccoli 1.5  cups chopped and steamed  or micro-waved in a little water (75 calories)  Glass of Water,    Cup of herbal tea (unsweetened, caffeine free)      Herbs and seasonings are free and encouraged to flavor your foods/vegetables.  Make your food delicious.    Tips for Success:  1.  Prepare proteins ahead of time (broil chicken breasts in bulk so you can grab and go), steel cut oats can be stored in casserole dish/bowl in the fridge for quick scoop in the morning and rewarm in microwave, make use of crock pot recipes (watch salt content).  Making meals that  "cover 3-4 future meals is an easy way to stay on track.  2.  Drink a 8-12 oz glass of water every 2-3 hours when awake.  We often mistake hunger for thirst, especially when losing weight.  3. Remember your Reward and Motivation when things get hard.  4.  Weigh yourself every morning and record, you'll stay on track better and learn how our biorhythms, diet and elimination patterns show up on the scale. Don't worry about 1 or 2 day patterns, but when on track you'll notice good trend downward of weight over 3-4 day segments.  Plateaus tend to resolve after 4-8 days in most cases if you stay consistent with your plan.  These are natural and part of weight loss, even if you're perfect with your plan execution.  5. Call if problems/concerns.  [facility] is a great tool to stay in touch and provide weekly outside accountability.   6.  Find a handful of meals/foods that keep you on track and feeling good and get into a routine that is sustainable for you.  7.  Take a complete multivitamin just to make sure all micronutrients are adequate during weight loss.  8. If losing hair/brittle nails it usually means you are not taking enough protein.  Minimum goal is 60 grams daily of protein for smaller women, 80 grams a day for men. Consider taking Biotin as supplement or a \"Hair and Nail\" multivitamin.          "

## 2021-06-03 VITALS
HEART RATE: 81 BPM | SYSTOLIC BLOOD PRESSURE: 142 MMHG | HEIGHT: 74 IN | OXYGEN SATURATION: 97 % | WEIGHT: 315 LBS | DIASTOLIC BLOOD PRESSURE: 74 MMHG | BODY MASS INDEX: 40.43 KG/M2

## 2021-06-14 NOTE — PROGRESS NOTES
[facility]:  Non-Surgical Weight Loss Intake Appointment   Date of visit: 12/20/2017  Physician: Max Cameron MD  Primary Care is Asif Luther MD.  Marv Guy   32 y.o.  male  Marv is a 32 y.o. year old male who is here today for consultation regarding non-surgical weight loss.   he was referred to the [facility] by his Primary Physician, Dr. Luther.    Weight History:   Wt Readings from Last 3 Encounters:   12/20/17 (!) 367 lb (166.5 kg)   07/22/14 (!) 324 lb (147 kg)     Body mass index is 46.6 kg/(m^2).       Assessment and Plan   Assessment: Marv Guy is a 32 y.o.,male presenting for assistance with medical weight loss.  Identified issues contributing to his excess weight include:     Mr. Guy has a long standing history of obesity and limited activity. A former tennis and  in his youth, he now has a sedentary IT/desk job that encourages him to graze of high starch/fat foods.  Breakfast is on the go and of lower nutritional value from gas station breakfast burrito. His lunches are mostly arturo saturated fat/high carb bagel sandwiches out of a vending machine and although he enjoys cooking, it's rare for him to bring food to work beyond Peanut Butter crackers.  His diet is deficient in protein and water and he has a tendency towards distracted/grazing eating both at home and work that can increase his calorie intake greatly.    He is not on any obesogenic drugs.    He has an upcoming sleep study to look into JARROD or other sleep disorders. If present, chronic sleep deprivation can contribute greatly to comfort/reactive eating that usually encourages weight gain.          Plan:  1.  Behavior Goals: 3 daily meals plan, ideally with a large breakfast, medium lunch        and smaller dinner. Avoidance of sugared-sweetened beverages and processed        carbohydrate snacks.  Work towards pre-planning meals to avoid falling back into old              habits/obesogenic habits.  Daily Food Tracking and morning weight recommended.  Weekly       check-in through MyChart to increase compliance.    2.  Diet Goals: Recommend a 100g/day protein intake goal, 80 oz water to start and if he wishes to track calories, a 2200 Calorie daily restriction should be a range that produces good weight loss and satiety.  Increased protein intake to insure at least  grams of protein per meal.      3.  Exercise/Activity Goals: walking the dog encouraged. Discussed looking into personal fitness regimen at [facility].  Long term goal of increasing endurance to allow for at least            200 minutes weekly of moderate exercise to maintain weight loss.      4.  Recommendation regarding weight loss medication:  Trial of phentermine, half tablet daily, 18.75mg. Will follow up in the next month, he'll likely start in January.    5.  Referral to Dietician for further education and customization of diet plan.    6.  Stress Reduction: doing well.    7.  Intake Labs:  Ordered.    8.   Sleep study pending per his PCP.    9. Mild elevation in BP today, will make sure no significant rise with phentermine, if over 145/90 would discontinue medication.    9. Hepatic steatosis with LFT abnormalities in the past. Discussed the importance of weight loss for reducing his progression to cirrhosis.  Given his age we discussed trying to optimize non surgical methods over the next 12-24 months as he's not interested in surgical weight loss options but in light of this end organ damage if unsuccessful with meaningful weight loss he should consider surgical options down the line.  His current BMI of 46.6 does meet criteria for surgical weight loss currently but he's not interested in this tool at this time.    10. Fatigue: pending PSG in January. Will check B12. Encouraged to start some walking/fitness regimen and will make sure labs are reasonable.    1. Obesity, Class III, BMI 40-49.9  (morbid obesity)  Comprehensive Metabolic Panel    Vitamin D, Total (25-Hydroxy)    Vitamin B12    Glycosylated Hemoglobin A1c    Amb Referral to Bariatric Dietician    phentermine (ADIPEX-P) 37.5 mg tablet    DISCONTINUED: phentermine (ADIPEX-P) 37.5 mg tablet   2. Hepatic steatosis  Comprehensive Metabolic Panel    phentermine (ADIPEX-P) 37.5 mg tablet    DISCONTINUED: phentermine (ADIPEX-P) 37.5 mg tablet   3. Fatigue  Vitamin B12          Return in about 7 weeks (around 2/7/2018).     Weight and Lifestyle History    Sleep history:  Goes to bed between 10:30pm-11pm falls asleep easily most nights.  Gets up once to use the bathroom rarely awoken by Dogs.  Wakes 6:45 AM on his own, out of bed around 7:45am.  Gets up, dressed and goes to work, commute about 30 minutes. Currently stops for breakfast burrito and sparkling water at [employer]. Flex time for IT job, arrives 8:30am to 9am. Up from desk for rounds frequently. Controls his own schedule.  Has free snacks at work, often water/tea. Some PB and crackers for snacks at desk.  Cold tea/semi sweet.  Lunch is around noon, has lunchroom food: bagel sandwich. Afternoons, some snacks, leaves 4:30-5pm. Goes home and lets dogs out, chores, wife home at 7pm, he cooks supper for them. Dinner is beer battered fish, taters and green beans, jumbalaya, chicken pot pies. After dinner, TV with wife watches movie around 9pm.    Former tennis and baseball.    Hours per night: see above  Snoring/apnea/insomnia? no  Restful/refreshed? Not always.  Shift work? No, flex time in IT  CPAP/BiPAP? no  Sleep study previously? Pending.  TV in bedroom? Yes.  Sleep aids/pills? none      STOP-BANG score for sleep apnea : pending PSG in January.  For general population   JARROD - Low Risk : Yes to 0 - 2 questions  JARROD - Intermediate Risk : Yes to 3 - 4 questions  JARROD - High Risk : Yes to 5 - 8 questions  or Yes to 2 or more of 4 STOP questions + male gender  or Yes to 2 or more of 4 STOP questions +  BMI > 35kg/m2  or Yes to 2 or more of 4 STOP questions + neck circumference 17 inches / 43cm in male or 16 inches / 41cm in female    Weight History:  In what way is your excess weight affecting your wellness/health? Tired al ote  Heaviest weight: current  Any Previous weight loss, what was the most lost and method: self guided.  Birth weIight, High School graduation weight: na  Lowest adult weight: last time under 300 lbs was over 10 years ago.  Maternal health/smoking/weight: yes  When did you start gaining weight and what were the circumstances? 2002-2204 umemployed. .  Is anyone else in your immediate family overweight? Bariatric surgery in sister. Mom a bit.  Finally,  Is there a weight you desire to be, what is your goal weight that would define successful weight loss for you? Under 300lbs.   I would like to lose weight so I can  :  Be more active:  Walk dogs, not be tired.   .     Barriers to weight loss:  Is anyone else at home/work/family a barrier to your weight loss? No.  Work schedule/location? See above  Current stressors include: none.  Mobility problems: none.    Counseled on health benefits of weight loss, goals for metabolic/diabetic risk reduction, HTN reduction, improved sleep and mobility, cancer reduction, longevity.    Fitness History:  Were you ever an athlete?yes     Which sports? See above  When was the last time you walked/hiked a mile? Last year.  Do you have any limitations for activity?no  Do you have access to a gym, pool, club, fitness center, or ? Limited.                Do you have any fitness goals/dreams that could motivate your weight loss?na    On a scale from 0-10,  how willing are you to start some sort of movement/exercise regimen? na    Counseled on physiologic benefits of exercise and for long term weight maintenance, goal working towards 200-300 minutes weekly.     Food History:  Who buys groceries?  He does.  Do you eat at dinner table, is the TV on or off,  family present? In front TV.  Any soda, how much? rare  Meals per day? See above  Snacks per day? See above, lots of vending fare at work break room.  Breakfast typically is: see above  Lunch typically is: see above  Dinner  is: see above  Weekly Fast Food/dining out: see above  Snacks consist of: see above    Food sensitivities/allergies/intolerances/avoidances: no  Water per day? See above    Any binging behavior?no  Any induced vomiting/purging? no  Any history of anorexia/bulimia? no  Any night eating issues? no  Stress induced eating? occ    Goal Setting:  Short Term Goals 10% weight loss goal is 331 lbs is 10% weight loss  Long Term Goals 300 lbs is her personal goal.              Patient Profile   Social History     Social History Narrative     No narrative on file     Family History   Problem Relation Age of Onset     No Medical Problems Mother      Hyperlipidemia Father      Hypertension Father      Obesity Maternal Uncle      Hypertension Maternal Uncle      Hyperlipidemia Maternal Uncle      Heart disease Maternal Uncle      Arthritis Maternal Grandmother      Hypertension Maternal Grandmother      Hyperlipidemia Maternal Grandmother      Heart disease Maternal Grandmother      Stroke Maternal Grandmother      Diabetes Maternal Grandmother      Lung cancer Maternal Grandfather      Emphysema Maternal Grandfather      Arthritis Paternal Grandmother      Obesity Paternal Grandmother      Stroke Paternal Grandmother           Past Medical History   There is no problem list on file for this patient.    Amoxicillin and Sulfa (sulfonamide antibiotics)  Current Outpatient Prescriptions   Medication Sig     GLUCOSAMINE/D3/BOSWELLIA GABINO (GLUCOSAMINE DAILY COMPLEX ORAL) Take 2 tablets by mouth daily.     MULTIVITAMIN ORAL Take 1 tablet by mouth daily.     phentermine (ADIPEX-P) 37.5 mg tablet Start with 1/2 a tablet daily (18.75mg).       Past Surgical History  He has a past surgical history that includes [place]  "tooth extraction.     Examination   BP (!) 136/92  Pulse 97  Resp 18  Ht 6' 2.41\" (1.89 m)  Wt (!) 367 lb (166.5 kg)  SpO2 100%  BMI 46.6 kg/m2  Height: 6' 2.41\" (1.89 m) (12/20/2017  1:42 PM)  Initial Weight: 367 lbs (12/20/2017  1:42 PM)  Weight: 367 lb (166.5 kg) (12/20/2017  1:42 PM)  Weight loss from initial: 0 (12/20/2017  1:42 PM)  % Weight loss: 0 % (12/20/2017  1:42 PM)  BMI (Calculated): 46.6 (12/20/2017  1:42 PM)  SpO2: 100 % (12/20/2017  1:42 PM)  Waist Circumference (In): 58 Inches (12/20/2017  1:42 PM)  Hip Circumference (In): 54 Inches (12/20/2017  1:42 PM)  Neck Circumference (In): 20 Inches (12/20/2017  1:42 PM)  General:  Alert and ambulatory, motivated for change  HEENT:  No conjunctival pallor, moist mucous Membranes, neck is thick.  Pulmonary:  Normal respiratory effort, no cough, no audible wheezes/crackles.  CV:  Regular borderline rate and Rhythm, no murmurs, pulses 2 plus  Abdominal: obese, soft, non tender.  Extremities: no edema, tremor. Normal gait.  Skin:  Warm/dry without pallor or jaundice.  Pscyh/Mood: pleasant, motivated.                                    LABS: ordered    Last recorded labs include:  Lab Results   Component Value Date    WBC 5.7 10/25/2016    HGB 16.4 10/25/2016    HCT 45.7 10/25/2016    MCV 91 10/25/2016     10/25/2016      No results found for: [id number] Lab Results   Component Value Date    HGBA1C 5.4 12/20/2017      Lab Results   Component Value Date    CHOL 189 05/09/2017    No results found for: PTH      Lab Results   Component Value Date    FERRITIN 177 07/28/2014      Lab Results   Component Value Date    HDL 40 05/09/2017      No results found for: [id number] No results found for: 14416   Lab Results   Component Value Date    LDLCALC 130 (H) 05/09/2017    Lab Results   Component Value Date    TSH 2.22 10/25/2016    No results found for: FOLATE   Lab Results   Component Value Date    TRIG 97 05/09/2017    Lab Results   Component Value Date    " ALT 96 (H) 2017    AST 58 (H) 2017    ALKPHOS 64 2017    BILITOT 1.1 (H) 2017    No results found for: TESTOSTERONE     No components found for: CHOLHDL No results found for: 7597   @resusfast(vitamin a: 1)@       Other Notables/imaging:     Counselin minutes spent in direct consultation with the patient regarding conditions contributing to excess weight accumulation, with over 50% of the time spent in counseling, goal setting and initiating a plan to lose their excess weight.    Max Cameron MD  [facility].  2017

## 2021-06-15 NOTE — PROGRESS NOTES
Non-surgical Weight Loss Initial Diet Evaluation     Assessment:  Pt is a 32 y.o. male being seen today for non-surgical RD nutritional evaluation. Today we reviewed current eating habits and level of physical activity, and instructed on the changes that are required for successful weight loss outcomes.    Pt desires weight loss to become healthier and prevent chronic diseases, such as DM. 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 Pt and pt's wife are trying to eat healthier.     Personal Goals: Reduced sleep apnea, weight loss, and more energy.   Personal goal weight: not discussed     Phentermine:taking 1/2 tab daily     Pt's Initial Weight: 367 lbs  Weight: 355 lb (161 kg)  Weight loss from initial: 12  % Weight loss: 3.27 %  BMI: Body mass index is 45.58 kg/(m^2).  IBW: 190 lbs    Estimated RMR (Hamlin-St Jeor equation): 2622 calories  Protein requirements (.5grams to .9grams per pound IBW, 20-30% of calories, minimum of 60-80gm per day):   grams     Food allergies, intolerances, Jainism customs: dislikes tofu.     Vitamins/Mineral Supplementation: none     Biggest struggle with weight loss:emotional eating, portion size, lack of variety     Who does the grocery shopping for your household? Pt and wife   Who prepares your meals at home? Pt     Diet Recall/Time: Pt wakes up 7:00am  Breakfast: 2 eggs, 2 sausage links, 1/4 cup cottage cheese (26g)   Am Snack: almonds and granola (7g)   Lunch: bologna sandwich, greek yogurt, slice cheese (40g)  Pm snack:1-2 string cheese (8-16g)   Dinner: grilled chicken, vegetables, 1 red skin potato (30g)   HS Snack: 1 string cheese (8g)     Per Diet recall estimated protein: 127 grams    Fats used at home: oil    Fried Foods: 0 times per week    Meals per week away from home: 2x/week   Sit down: none   Fast Food: subway, Kiki's, etc.    Take Out: none    Delivery: none   Buffet: none   Cafeteria: none   Specialty Coffee: 1x/month   Ice Cream/FrozenYogurt/Bakery: none   Comments: none     Recommended limiting eating out to no more than 2x/week.  Patient and I reviewed the importance of eating three consistent meals per day; as well as meal timing to be spaced 4-5 hours apart.  Snack choices: 100-150 calories (1-2x/day if physically hungry), incorporating a fruit/vegetable w/ protein source.    Portion Sizes problematic? yes per patient/diet recall  Encouraged slowing meal times down, 20-30 minutes, chewing to applesauce consistency.   To aid in proper portion control and slow meal time down discussed consuming meals off smaller plates, use toddler/children utensils and set utensils down after each bite.    Protein, vegetables/fruits, carbohydrates:   Reviewed lean protein sources today. Recommended consuming 20-30 gm protein at 3 meals daily.  The patient and I discussed the importance of including lean/low fat protein at each meal and limiting carbohydrate intake to less than 25% of plate volume.     Beverages (Type/Oz. per day)  Water: 32 oz water, 20 oz carbonated water   Coffee: none   Tea: 2 bottles of sweetened raspberry tea   Milk: none   Regular soda: none   Diet soda: none   Juice: none   Ky-Aid/lemonade/etc: none  Alcohol: not discussed     Discussed the importance of adequate hydration and the goal of 64+ oz of fluid daily.   The patient understands the importance of avoiding all alcoholic and sweetened drinks, and instead choosing 64 oz plain water.    Exercise  ADLs; nothing routine established.     Pt's understands that 45-60 minutes of daily activity is an important part of weight loss success.   Encouraged pt to incorporate upper body strength training exercise, even if its lifting soup cans while watching tv at night, doing push ups/sit-ups, and abdominal work.    PES statement:    1. (NI-1.3)Excessive energy intake related to Food and nutrition  related knowledge deficit concerning excessive energy/oral intake as evidenced by Intake of high caloric density foods/beverages (sweetened tea) at meals and/or snacks; large portions; frequent grazing; Estimated intake that exceeds estimated daily energy intake;  Frequent excessive fast food or restaurant intake; and BMI 45.        Intervention  Discussion:  1. Educated pt on Eat Better, Move More, Live Well: Non-surgical Weight Loss Handout  2. Recommended to consume 20-30 gm protein at 3 meals daily. 60-80 grams daily total.  3. Discussed using a protein shake as a meal replacement   4. Educated pt on food labels: keeping total fat grams <10, total sugar grams <10, fiber >3gm per serving.   5. Gave food journal homework, to be completed for f/u appointment.  6. Discussed benefits of drinking 64 oz of water and eliminating sweetened beverages.   7. Plate Method: The patient and I discussed the importance of including lean/low  fat protein at each meal and limiting carbohydrate intake to less  than 25% of plate volume.    Instructions/Goals:   1. Include 20-30 gm protein at each meal.  2. Increase vegetable/fruit intake, by having a vegetable or fruit with each meal daily. Recommended pt to increase vegetable/fruit intake to 4-5 servings daily.  3. Increase fluid intake to 64oz daily: choose plain or calorie/alcohol-free beverages.  4. Incorporate daily structured activity, 45-60 minutes most days of the week  5. Read food labels more consistently: keeping total fat grams <10, total sugar grams <10, fiber >3gm per serving.  6. Fill out food journal and bring to next month's visit.  7. Practice plate method: 1/2 plate lean/low fat protein source, vegetable/fruit, <25% of plate complex carbohydrates.  8. Practice eating off of smaller plates/bowls, chewing to applesauce consistency, taking 20-30 minutes to eat in a calm/relaxed environment without distractions of tv/email/cell phone.    Goals set by patient:  1.  Complete food journal for 7 days, with emphasis on documenting energy levels (1-10) daily  2. Cut out raspberry sweetened tea; replace with unsweetened tea.     Handouts Provided:  Eat Better, Move More, Live Well: Non-surgical Weight Loss Handout  Plate Method  Food journal    Monitor/Evaluation:    Pt will f/u in one month with bariatrician and RD.     Plan for next visit with RD:    Review food journal   Review carbohydrates/fiber  Exercise      Time In: 8:50am  Time Out: 9:40am      ABN signed: Yes

## 2021-06-15 NOTE — PROGRESS NOTES
"Bariatric Clinic Follow-Up Visit:    Marv Guy is a 32 y.o.  male with Body mass index is 45.75 kg/(m^2).  presenting here today for follow-up on non-surgical efforts for weight loss. Original Intake visit occurred on 12/20/17 with a weight of 367 lbs and BMI of 46.6.  Along with diet and behavior changes, he has been using phentermine 18.75mg  to assist his weight loss goals.  See his intake visit notes for details on identified contributors to weight gain in the past.    Weight:   Wt Readings from Last 2 Encounters:   02/09/18 (!) 356 lb 4.8 oz (161.6 kg)   01/30/18 (!) 355 lb (161 kg)    pounds  Height: 6' 2\" (1.88 m) (2/9/2018  2:00 PM)  Initial Weight: 367 lbs (2/9/2018  2:00 PM)  Weight: 356 lb 4.8 oz (161.6 kg) (2/9/2018  2:00 PM)  Weight loss from initial: 10.7 (2/9/2018  2:00 PM)  % Weight loss: 2.92 % (2/9/2018  2:00 PM)  BMI (Calculated): 45.7 (2/9/2018  2:00 PM)  SpO2: 96 % (2/9/2018  2:00 PM)  Heart Rate (/min): 89 (2/9/2018  2:00 PM)  BP (mmHg): 155/76 (2/9/2018  2:00 PM)  Waist Circumference (In): 58 Inches (12/20/2017  1:42 PM)  Hip Circumference (In): 54 Inches (12/20/2017  1:42 PM)  Neck Circumference (In): 20 Inches (12/20/2017  1:42 PM)    Comorbidities:There is no problem list on file for this patient.      Current Outpatient Prescriptions:      phentermine (ADIPEX-P) 37.5 mg tablet, Half tablet daily, Disp: 30 tablet, Rfl: 0     zolpidem (AMBIEN) 10 mg tablet, as needed., Disp: , Rfl: 1      Interim: Since our last visit, he has lost 11 lbs, is tolerating phentermine with mild BP elevation today. No mood swings. He was found to have severe JARROD on his recent PSG and is waiting for his cpap fitting for home use. We discussed that this should greatly improve his energy and behavior changes and diet. He's contemplative about exercise and we discussed committing to weights 3 days weekly and is planning to play tennis regularly with his wife this Spring.  Having some evening hunger urges and may " be a little over restricted in the afternoon so discussed appropriate portions for his size today.    Plan:   1.  Diet: continue working with dietician and 3 meals daily, 1396-5176 kcal diet with 100-170g protein per day.  2. Exercise: weights 3 days weekly goal and aerobic activity 4 days weekly.  3. Medication: phentermine refilled, will continue with half tab given BP elevation today.   4.  Stress Reduction:  Doing well  5. Goals: 10% weight loss goal is 331 lbs.    6. JARROD: follow up for cpap fitting with sleep clinic next week as planned.         We discussed [facility] Basics including:  -eating 3 meals daily  -eating protein first  -eating slowly, chewing food well  -avoiding/limiting calorie containing beverages  -We discussed the importance of restorative sleep and stress management in maintaining a healthy weight.  -We discussed the National Weight Control Registry healthy weight maintenance strategies and ways to optimize metabolism.  -We discussed the importance of physical activity including cardiovascular and strength training in maintaining a healthier weight and explored viable options.    Most recent labs:  Lab Results   Component Value Date    WBC 5.7 10/25/2016    HGB 16.4 10/25/2016    HCT 45.7 10/25/2016    MCV 91 10/25/2016     10/25/2016     Lab Results   Component Value Date    CHOL 189 05/09/2017     Lab Results   Component Value Date    HDL 40 05/09/2017     Lab Results   Component Value Date    LDLCALC 130 (H) 05/09/2017     Lab Results   Component Value Date    TRIG 97 05/09/2017     No components found for: CHOLHDL  Lab Results   Component Value Date     (H) 12/20/2017    AST 65 (H) 12/20/2017    ALKPHOS 65 12/20/2017    BILITOT 1.0 12/20/2017     Lab Results   Component Value Date    HGBA1C 5.4 12/20/2017     Lab Results   Component Value Date    CORXJPYM49 591 12/20/2017     Lab Results   Component Value Date    GGFCCXRP53ZO 23.3 (L) 12/20/2017     Lab Results  "  Component Value Date    FERRITIN 177 07/28/2014     No results found for: PTH  No results found for: 75151  No results found for: 7597  Lab Results   Component Value Date    TSH 2.22 10/25/2016     No results found for: TESTOSTERONE    DIETARY HISTORY    Positive Changes Since Last Visit: paying attention to protein intake, water intake.  Struggling With: some evening hunger surges.    Knowledgeable in Reading Food Labels: yes  Getting Adequate Protein: yes  Sleeping 7-8 hours/day PSG with severe JARROD.  Stress management doing well    PHYSICAL ACTIVITY PATTERNS:  Cardiovascular: limited  Strength Training: limited    REVIEW OF SYSTEMS  GENERAL/CONSTITUTIONAL:  Tolerated sleep study, excited about getting cpap as he reports a AHI of 70.  HEENT:   nl  CARDIOVASCULAR:   nl  PULMONARY:   nl  GASTROINTESTINAL:  nl  UROLOGIC:  nl  NEUROLOGIC:  nl  PSYCHIATRIC:  nl  MUSCULOSKELETAL/RHEUMATOLOGIC   nl  ENDOCRINE:  nl  DERMATOLOGIC:  nl    PHYSICAL EXAM:  Vitals: /75 (Patient Site: Right Arm, Patient Position: Sitting, Cuff Size: Adult Large)  Pulse 89  Ht 6' 2\" (1.88 m)  Wt (!) 356 lb 4.8 oz (161.6 kg)  SpO2 96%  BMI 45.75 kg/m2  Height: 6' 2\" (1.88 m) (2/9/2018  2:00 PM)  Initial Weight: 367 lbs (2/9/2018  2:00 PM)  Weight: 356 lb 4.8 oz (161.6 kg) (2/9/2018  2:00 PM)  Weight loss from initial: 10.7 (2/9/2018  2:00 PM)  % Weight loss: 2.92 % (2/9/2018  2:00 PM)  BMI (Calculated): 45.7 (2/9/2018  2:00 PM)  SpO2: 96 % (2/9/2018  2:00 PM)  Heart Rate (/min): 89 (2/9/2018  2:00 PM)  BP (mmHg): 155/76 (2/9/2018  2:00 PM)  Waist Circumference (In): 58 Inches (12/20/2017  1:42 PM)  Hip Circumference (In): 54 Inches (12/20/2017  1:42 PM)  Neck Circumference (In): 20 Inches (12/20/2017  1:42 PM)    GEN: Pleasant, well groomed, in no acute distress  HEENT: PEERL, EOMI, airway clear .  NECK: No swelling.  HEART: Rhythm regular, rate regular, no murmur   LUNGS: Clear without crackles or wheezes. No cough.  ABDOMEN: " obese.  EXTREMITIES: 2 plus palpable peripheral pulses, radial.  NEURO: Alert and Oriented X3, normal gait and speech, no tremor.  SKIN: No visible rashes, pallor or jaundice.        25 minutes was spent in direct consultation, with over 50% of it spent in counseling regarding their plan for excess weight reduction and health modification.  Max Cameron MD  [facility]  2:38 PM

## 2021-06-16 PROBLEM — E66.01 OBESITY, CLASS III, BMI 40-49.9 (MORBID OBESITY) (H): Status: ACTIVE | Noted: 2018-02-09

## 2021-06-16 PROBLEM — G47.33 OSA ON CPAP: Status: ACTIVE | Noted: 2018-02-09

## 2021-06-16 PROBLEM — K76.0 HEPATIC STEATOSIS: Status: ACTIVE | Noted: 2018-02-09

## 2021-06-16 NOTE — PROGRESS NOTES
Non-surgical Weight Loss Follow Up Diet Evaluation    Assessment:  This patient is a 32 y.o. male is being seen today for follow-up non-surgical nutritional evaluation. Today we reviewed the patients current eating habits and level of physical activity, and instructed on the changes that are required for successful weight loss outcomes.    Phentermine: taking 2x/week     Pt's Initial Weight: 367 lbs  Weight: 347 lb 4.8 oz (157.5 kg)  Weight loss from initial: 19.7  % Weight loss: 5.37 %  BMI: Body mass index is 44.59 kg/(m^2).  IBW:  190 lbs    Personal goal weight: not discussed    Estimated RMR ([place]-St Jeor equation): 2622 calories  Protein requirements (.5grams to .9grams per pound IBW, 20-30% of calories, minimum of 60-80gm per day):   grams    Progress made since last visit: Pt reduced eating out, cut out soda, and switched to eating frequent small meals rather than 3 meals. Pt reports eating more lean proteins and vegetables. Reduced condiments. Pt using protein shake 3-5x/week when working. Pt reduced raspberry tea to 1x/week compared to 2 bottles/day. Pt kept a food journal, however, forgot to bring your meal.     Concerns: large portion sizes, grazing.       Diet Recall/Time:   Breakfast: pre-made breakfast burrito, 1/2 cup cottage cheese, 1 banana (40g)   Am Snack: cheese stick (7g)  Lunch: 2 jerky sticks, clementines, handful of almonds (20g)  Pm snack: 2 cheese stick (14g)  Dinner: 6-8 oz meat, vegetables, rice (40-50g)   HS Snack: whole wheat crackers with salsa     Per Diet recall estimated protein: 115 grams    Meals per week away from home: none      Recommended limiting eating out to no more than 2x/week.  Patient and I reviewed the importance of eating three consistent meals per day; as well as meal timing to be spaced 4-5 hours apart.  Snack choices: 100-150 calories (1-2x/day if physically hungry), incorporating a fruit/vegetable w/ protein source.    Meal Duration: 10  minutrs    Portion Sizes problematic? YES per patient/diet recall  Encouraged slowing meal times down, 20-30 minutes, chewing to applesauce consistency.   To aid in proper portion control and slow meal time down discussed consuming meals off smaller plates, use toddler/children utensils and set utensils down after each bite.    Protein, vegetables/fruits, carbohydrates:   The patient and I discussed the importance of including lean/low fat protein at each meal and limiting carbohydrate intake to less than 25% of plate volume.       Vitamins/Mineral Supplementation:none     Beverages (Type/Oz. per day)  Water: 64 oz  Coffee: none   Tea: none   Milk: none  Regular soda: sprite 1x/week   Diet soda:none   Juice: 1 raspberry tea/day   Ky-Aid/lemonade/etc: none   Alcohol: none     Discussed the importance of adequate hydration and the goal of 64+ oz of fluid daily.   The patient understands the importance of  avoiding all sweetened and alcoholic drinks, and instead choosing 64 oz plain water.    Exercise  ADLs. Pt plans to walk dogs more.     Pt's understands that 45-60 minutes of daily activity is an important part of weight loss success.   Encouraged pt to incorporate  strength training exercise in addition to cardiovascular exercise most days of the week.    PES statement:     1. (NI-1.3)Excessive energy intake related to Food and nutrition related knowledge deficit concerning excessive energy/oral intake as evidenced by Intake of high caloric density foods at meals and/or snacks; large portion; frequent grazing; Estimated intake that exceeds estimated daily energy intake; and BMI 44.       Intervention:  Discussion:  1. Discussed the benefits of developing an exercise routine   2. Reviewed lean protein sources.  30-40gm protein at 3 meals daily.  grams daily total.  3. Educated pt on food labels: keeping total fat grams <10, total sugar grams <10, fiber >3gm per serving.  4. Discussed portion sizes, with  emphasis on reducing meat size to 4-5 oz.   5. Plate Method: The patient and I discussed the importance of including lean/low  fat protein at each meal and limiting carbohydrate intake to less  than 25% of plate volume.  Instructions/Goals:   1. Include 20-30 gm protein at each meal.  2. Increase vegetable/fruit intake, by having a vegetable or fruit with each meal daily. Recommended pt to increase vegetable/fruit intake to 4-5 servings daily.  3. Increase fluid intake to 64oz daily: choose plain or calorie/alcohol-free beverages.  4. Incorporate daily structured activity, 45-60 minutes most days of the week  5. Read food labels more consistently: keeping total fat grams <10, total sugar grams <10, fiber >3gm per serving.   6. Practice plate method: 1/2 plate lean/low fat protein source, vegetable/fruit, <25% of plate complex carbohydrates.  7. Carbohydrates from grain sources at meal times to be no more than 1 Carb Choice, ie: 15-20 gm total carbohydrate per serving  8. Practice eating off of smaller plates/bowls, chewing to applesauce consistency, taking 20-30 minutes to eat in a calm/relaxed environment without distractions of tv/email/cell phone.    Handouts Provided:  Plate method    Monitor/Evaluation:    Pt will f/u in one month with bariatrician, and f/u in two months with RD.    Plan for next visit with RD:    Review carbohydrates/fiber  Discuss building healthy snack options   Exercise      Time In: 1:30pm  Time Out: 2:00pm      ABN signed: Yes

## 2021-06-19 NOTE — LETTER
Letter by Max Cameron MD at      Author: Max Cameron MD Service: -- Author Type: --    Filed:  Encounter Date: 4/4/2019 Status: (Other)         Marv Guy  [address] [place] [address]               April 4, 2019      Dear Marv:    We are sorry that you missed your appointment with Dr Cameron on 4/4/2019. Your health and follow-up medical care are important to us. Please call our office as soon as possible so that we may reschedule your appointment. If you have already rescheduled your appointment, please disregard this letter.    Sincerely,        [facility]

## 2021-06-20 NOTE — PROGRESS NOTES
Non-surgical Weight Loss Follow Up Diet Evaluation    Assessment:  This patient is a 33 y.o. male is being seen today for follow-up non-surgical nutritional evaluation. Today we reviewed the patients current eating habits and level of physical activity, and instructed on the changes that are required for successful weight loss outcomes.    Phentermine: 1/2 tablet refill per bariatrician     Pt's Initial Weight: 367 lbs  Weight: 358 lb (162.4 kg)  Weight loss from initial: 9  % Weight loss: 2.45 %  BMI: Body mass index is 45.96 kg/(m^2).  IBW: 190 lbs    Personal goal weight: none established per pt.      Pt Active Problem List Diagnosis:     Patient Active Problem List   Diagnosis     JARROD on CPAP     Hepatic steatosis     Obesity, Class III, BMI 40-49.9 (morbid obesity) (H)     Estimated RMR ([place]-St Jeor equation): 2622 calories  Protein requirements (.5grams to .9grams per pound IBW, 20-30% of calories, minimum of 60-80gm per day):   grams    Progress made since last visit: Pt reports falling off track with his healthy eating d/t wife becoming pregnant. Pt reports increasing his fast food intake and soda. Pt now tracking to return to healthy habits by cutting out soda and watching portion sizes. Pt also states trying to watch carb intake d/t his wife having gestational DM. Pt has been aiming for 150 gm of protein/day.     Concerns: Frequent fast food, caloric drink intake, no exercise routine established; large portion sizes.     Diet Recall/Time:   Breakfast: english muffin,1 egg, 1/2 cup cottage cheese, fruit (20g)   Am Snack: none   Lunch: quesdilla chicken and cheese from [employer] (30g)   Pm snack: one   Dinner: 5-6 meatballs,pasta with prosper, 2 slices of toast (50g)   HS Snack: none     Protein: 100 grams    Meals per week away from home: eating out fast food for lunch most days of the week.      Recommended limiting eating out to no more than 2x/week.  Patient and I reviewed the importance of  eating three consistent meals per day; as well as meal timing to be spaced 4-5 hours apart.  Snack choices: 100-150 calories (1-2x/day if physically hungry), incorporating a fruit/vegetable w/ protein source.    Meal Duration: 15 minutes    Portion Sizes problematic? YES per patient/diet recall  Encouraged slowing meal times down, 20-30 minutes, chewing to applesauce consistency.   To aid in proper portion control and slow meal time down discussed consuming meals off smaller plates, use toddler/children utensils and set utensils down after each bite.    Protein, vegetables/fruits, carbohydrates:   The patient and I discussed the importance of including lean/low fat protein at each meal and limiting carbohydrate intake to less than 25% of plate volume.       Vitamins/Mineral Supplementation: not discussed     Beverages (Type/Oz. per day)  Water: sparkling water 1 can   Coffee: none  Tea: none   Milk: none  Regular soda: none   Diet soda: none   Juice: none   Ky-Aid/lemonade/etc: Body Armor 2 bottles/day   Alcohol: none     Discussed the importance of adequate hydration and the goal of 64+ oz of fluid daily.   The patient understands the importance of  avoiding all sweetened and alcoholic drinks, and instead choosing 64 oz plain water.    Exercise  ADLs at job and home. Pt uses Apple Watch for fitness- walks 6 miles a day.     Pt's understands that 45-60 minutes of daily activity is an important part of weight loss success.   Encouraged pt to incorporate  strength training exercise in addition to cardiovascular exercise most days of the week.    PES statement:     1. (NI-1.3)Excessive energy intake related to Not ready for diet/lifestyle change as evidenced by Intake of high caloric density foods/beverages (sports drinks) at meals and/or snacks; large portion; Estimated intake that exceeds estimated daily energy intake; Frequent excessive fast food or restaurant intake; and BMI 45.          Intervention:  Discussion:  1. Educated pt on importance of water intake for hydration  2. Reviewed reading a food label; aiming for less than 10 grams of total sugar and fat per serving.   3. Reviewed lean protein sources.  25-35gm protein at 3 meals daily.  grams daily total.  4. Carbohydrate Countin carbohydrate choice/serving = 15-20gm total carbohydrate    5. Discussed using a protein shake as a meal replacement instead of fast food for lunch.     Instructions/Goals:   1. Include 20-30 gm protein at each meal.  2. Increase vegetable/fruit intake, by having a vegetable or fruit with each meal daily. Recommended pt to increase vegetable/fruit intake to 4-5 servings daily.  3. Increase fluid intake to 64oz daily: choose plain or calorie/alcohol-free beverages.  4. Incorporate daily structured activity, 45-60 minutes most days of the week  5. Read food labels more consistently: keeping total fat grams <10, total sugar grams <10, fiber >3gm per serving.   6. Practice plate method: 1/2 plate lean/low fat protein source, vegetable/fruit, <25% of plate complex carbohydrates.  7. Carbohydrates from grain sources at meal times to be no more than 1 Carb Choice, ie: 15-20 gm total carbohydrate per serving  8. Practice eating off of smaller plates/bowls, chewing to applesauce consistency, taking 20-30 minutes to eat in a calm/relaxed environment without distractions of tv/email/cell phone.    Goals set by patient:  1. Cut out sports drinks; replace with 1 gallon of water.  2. Use protein shake as lunch meal; cut out fast food intake.     Handouts Provided:  Lean protein sources  Goal sheet     Monitor/Evaluation:    Pt will f/u in one month with bariatrician and RD in 2 months     Plan for next visit with RD:    Review plate method and goals   Educate pt on nicol planning   Exercise      Time In: 8:30am  Time Out: 9:00am      ABN signed: Yes

## 2021-06-20 NOTE — PROGRESS NOTES
Here for f/u non-surgical weight loss.  Pt ran out of his phentermine about 2 months ago.  See flowsheet.    Brenda Partida RN, CBN  [facility]  P [phone]  F [phone]

## 2021-06-20 NOTE — PROGRESS NOTES
"Bariatric Clinic Follow-Up Visit:    Marv Guy is a 33 y.o.  male with Body mass index is 45.58 kg/(m^2).  presenting here today for follow-up on non-surgical efforts for weight loss. Original Intake visit occurred on 12/20/17 with a weight of 367lbs and BMI of 46.4.  Along with diet and behavior changes, he has been using half tab phentermine to assist his weight loss goals.  See his intake visit notes for details on identified contributors to weight gain in the past.    Weight:   Wt Readings from Last 2 Encounters:   09/12/18 (!) 355 lb (161 kg)   03/06/18 (!) 347 lb 4.8 oz (157.5 kg)    pounds  Height: 6' 2\" (1.88 m) (9/12/2018 10:55 AM)  Initial Weight: 367 lbs (9/12/2018 10:55 AM)  Weight: 355 lb (161 kg) (9/12/2018 10:55 AM)  Weight loss from initial: 12 (9/12/2018 10:55 AM)  % Weight loss: 3.27 % (9/12/2018 10:55 AM)  BMI (Calculated): 45.6 (9/12/2018 10:55 AM)  SpO2: 96 % (2/9/2018  2:00 PM)  Heart Rate (/min): 89 (2/9/2018  2:00 PM)  BP (mmHg): 155/76 (2/9/2018  2:00 PM)  Waist Circumference (In): 58 Inches (12/20/2017  1:42 PM)  Hip Circumference (In): 54 Inches (12/20/2017  1:42 PM)  Neck Circumference (In): 20 Inches (12/20/2017  1:42 PM)    Comorbidities:  Patient Active Problem List   Diagnosis     JARROD on CPAP     Hepatic steatosis     Obesity, Class III, BMI 40-49.9 (morbid obesity) (H)       Current Outpatient Prescriptions:      gabapentin (NEURONTIN) 300 MG capsule, Take 3 capsules by mouth at bedtime., Disp: , Rfl:      phentermine (ADIPEX-P) 37.5 mg tablet, Half tablet daily, Disp: 60 tablet, Rfl: 0     zolpidem (AMBIEN) 10 mg tablet, as needed., Disp: , Rfl: 1      Interim: Since our last visit, he has run out of phentermine and been lost to follow up the last 6 months, having last seen our dietician in March.  Since his last visit with me in February, he's down another pound and maintaining a 12 lb weight reduction.  He did relapse with his soda pop/fast food habits but has cut back again " recently and as he prepares for life as a first time father (wife is about 12 weeks pregnant after needing some fertility treatments) he's motivated to get back on track. He found phentermine helpful and no significant side effects on half tablet dose and is interested in a refill.    Plan:   1.  Diet: will follow up with dietician again, reviewed his RMR of 2622kcal and protein goal of 95-170g/day. Will cut out sugared beverages, juice and increase his water intake.  2. Exercise: tendency towards pushing too hard, too soon in the past. Discussed base fitness development and will start walking program.  3. Medication: phentermine, half tab fill. Using vitamin D, can increase to 3000-5000IUs daily.  4.  Stress Reduction:  Preparing for fatherhood.   5. Goals:  10% weight loss goal from original intake last Winter is 331lbs. LTG under 300 lbs.   6. JARROD: using cpap, weight loss may improve.   7. Low D: supplementing. Recheck in Spring of 2019.  8. Restless legs: on gabapentin now, cautioned on potential weight gain/hunger changes and needing to be mindful about portions.  9 Hepatic steatosis: mild LFT elevation last Winter improved on last testing in May. Weight loss should reduce risk of progression to cirrhosis.      We discussed [facility] Basics including:  -eating 3 meals daily  -eating protein first  -eating slowly, chewing food well  -avoiding/limiting calorie containing beverages  -We discussed the importance of restorative sleep and stress management in maintaining a healthy weight.  -We discussed the National Weight Control Registry healthy weight maintenance strategies and ways to optimize metabolism.  -We discussed the importance of physical activity including cardiovascular and strength training in maintaining a healthier weight and explored viable options.    Most recent labs:  Lab Results   Component Value Date    WBC 5.7 10/25/2016    HGB 16.4 10/25/2016    HCT 45.7 10/25/2016    MCV 91  "10/25/2016     10/25/2016     Lab Results   Component Value Date    CHOL 189 05/09/2017     Lab Results   Component Value Date    HDL 40 05/09/2017     Lab Results   Component Value Date    LDLCALC 130 (H) 05/09/2017     Lab Results   Component Value Date    TRIG 97 05/09/2017     No components found for: CHOLHDL  Lab Results   Component Value Date    ALT 41 05/08/2018    AST 30 05/08/2018    ALKPHOS 57 05/08/2018    BILITOT 1.2 (H) 05/08/2018     Lab Results   Component Value Date    HGBA1C 5.4 12/20/2017     Lab Results   Component Value Date    PZKKTYIE04 591 12/20/2017     Lab Results   Component Value Date    TWJIPARY19HA 23.3 (L) 12/20/2017     Lab Results   Component Value Date    FERRITIN 177 07/28/2014     No results found for: PTH  No results found for: 87655  No results found for: 7597  Lab Results   Component Value Date    TSH 2.22 10/25/2016     No results found for: TESTOSTERONE    DIETARY HISTORY    Positive Changes Since Last Visit: recognizing his fast food habit/soda habit as problems.   Struggling With: exercise/eating.    Knowledgeable in [place] Food Labels: has bought a scale and is measuring portions more often  Getting Adequate Protein: na  Sleeping 7-8 hours/day often  Stress management na    PHYSICAL ACTIVITY PATTERNS:  Cardiovascular: none  Strength Training: none    REVIEW OF SYSTEMS  GENERAL/CONSTITUTIONAL:  No illness.  HEENT:   na  CARDIOVASCULAR:   gets winded if walking up stairs too fast.  PULMONARY:   no cough. No hemoptysis.  GASTROINTESTINAL:  No pain  UROLOGIC:  na  NEUROLOGIC:  occ headaches  PSYCHIATRIC:  Stable mood on phentermin  MUSCULOSKELETAL/RHEUMATOLOGIC    No injuries  ENDOCRINE:  na  DERMATOLOGIC:  No rash    PHYSICAL EXAM:  Vitals: /70  Pulse 82  Resp 18  Ht 6' 2\" (1.88 m)  Wt (!) 355 lb (161 kg)  BMI 45.58 kg/m2  Height: 6' 2\" (1.88 m) (9/12/2018 10:55 AM)  Initial Weight: 367 lbs (9/12/2018 10:55 AM)  Weight: 355 lb (161 kg) (9/12/2018 10:55 " AM)  Weight loss from initial: 12 (9/12/2018 10:55 AM)  % Weight loss: 3.27 % (9/12/2018 10:55 AM)  BMI (Calculated): 45.6 (9/12/2018 10:55 AM)  SpO2: 96 % (2/9/2018  2:00 PM)  Heart Rate (/min): 89 (2/9/2018  2:00 PM)  BP (mmHg): 155/76 (2/9/2018  2:00 PM)  Waist Circumference (In): 58 Inches (12/20/2017  1:42 PM)  Hip Circumference (In): 54 Inches (12/20/2017  1:42 PM)  Neck Circumference (In): 20 Inches (12/20/2017  1:42 PM)    GEN: Pleasant, well groomed, in no acute distress  HEENT: PEERL, EOMI, airway clear .  NECK: No swelling.  HEART: Rhythm regular, rate regular, no murmur   LUNGS: Clear without crackles or wheezes. No cough.  ABDOMEN: obese..  EXTREMITIES: 2 plus palpable peripheral pulses, radial.  NEURO: Alert and Oriented X3, normal gait and speech.  SKIN: No visible rashes/pallor or jaundice.        25 minutes was spent in direct consultation, with over 50% of it spent in counseling regarding their plan for excess weight reduction and health modification.  Max Cameron MD  [facility]  11:39 AM

## 2021-06-21 NOTE — PROGRESS NOTES
"Bariatric Clinic Follow-Up Visit:    Marv Guy is a 33 y.o.  male with Body mass index is 45.48 kg/(m^2).  presenting here today for follow-up on non-surgical efforts for weight loss. Original Intake visit occurred on 9/12/18 restarted with a weight of 358 lbs and BMI of 45.  Along with diet and behavior changes, he has been using phentermine to assist his weight loss goals.  See his intake visit notes for details on identified contributors to weight gain in the past.    Weight:   Wt Readings from Last 2 Encounters:   10/31/18 (!) 354 lb 3.2 oz (160.7 kg)   09/27/18 (!) 358 lb (162.4 kg)    pounds  Height: 6' 2\" (1.88 m) (10/31/2018  3:05 PM)  Initial Weight: 367 lbs (9/27/2018  8:00 AM)  Weight: 354 lb 3.2 oz (160.7 kg) (10/31/2018  3:05 PM)  Weight loss from initial: 9 (9/27/2018  8:00 AM)  % Weight loss: 2.45 % (9/27/2018  8:00 AM)  BMI (Calculated): 45.5 (10/31/2018  3:05 PM)  SpO2: 96 % (10/31/2018  3:05 PM)  Heart Rate (/min): 89 (2/9/2018  2:00 PM)  BP (mmHg): 155/76 (2/9/2018  2:00 PM)  Waist Circumference (In): 58 Inches (12/20/2017  1:42 PM)  Hip Circumference (In): 54 Inches (12/20/2017  1:42 PM)  Neck Circumference (In): 20 Inches (12/20/2017  1:42 PM)    Comorbidities:  Patient Active Problem List   Diagnosis     JARROD on CPAP     Hepatic steatosis     Obesity, Class III, BMI 40-49.9 (morbid obesity) (H)       Current Outpatient Prescriptions:      gabapentin (NEURONTIN) 300 MG capsule, Take 3 capsules by mouth at bedtime., Disp: , Rfl:      phentermine (ADIPEX-P) 37.5 mg tablet, Half tablet daily, Disp: 60 tablet, Rfl: 0     zolpidem (AMBIEN) 10 mg tablet, as needed., Disp: , Rfl: 1     varenicline (CHANTIX) 0.5 MG tablet, Take 1 tablet (0.5 mg total) by mouth 2 (two) times a day., Disp: 180 tablet, Rfl: 0      Interim: Since our last visit, he has found that 10:30am phentermine works well for appetite, he's slowly trying to use more protein in his diet, goal of 95-170g/day at his height/weight. Has " relapsed w/ smoking likely over stress of his pregnant wife (first child). He's interested in quitting and we discussed a trial of Chantix for him which he's open to. Discussed setting quit date: 7 minutes counseling spent. He was not interested in Call it Quits program.  He's walking dog 30 minutes daily w/ wife but contemplating joining the gym.    Plan:   1.  Diet: Aiming for 100-150 g/day of protein, not to exceed 35-40g/meal.  2. Exercise: continue walking, add resistance training as planned w/ gym  3. Medication: phentermine tolerated. Has plenty.  4.  Stress Reduction:  Quit smoking.  5. Goals: under 325 lbs to start.      We discussed [facility]ariat[facility] Basics including:  -eating 3 meals daily  -eating protein first  -eating slowly, chewing food well  -avoiding/limiting calorie containing beverages  -We discussed the importance of restorative sleep and stress management in maintaining a healthy weight.  -We discussed the National Weight Control Registry healthy weight maintenance strategies and ways to optimize metabolism.  -We discussed the importance of physical activity including cardiovascular and strength training in maintaining a healthier weight and explored viable options.    Most recent labs:  Lab Results   Component Value Date    WBC 5.7 10/25/2016    HGB 16.4 10/25/2016    HCT 45.7 10/25/2016    MCV 91 10/25/2016     10/25/2016     Lab Results   Component Value Date    CHOL 189 05/09/2017     Lab Results   Component Value Date    HDL 40 05/09/2017     Lab Results   Component Value Date    LDLCALC 130 (H) 05/09/2017     Lab Results   Component Value Date    TRIG 97 05/09/2017     No components found for: CHOLHDL  Lab Results   Component Value Date    ALT 41 05/08/2018    AST 30 05/08/2018    ALKPHOS 57 05/08/2018    BILITOT 1.2 (H) 05/08/2018     Lab Results   Component Value Date    HGBA1C 5.4 12/20/2017     Lab Results   Component Value Date    YNRADCEV06 591 12/20/2017     Lab Results  "  Component Value Date    JWXLSDPB71RB 23.3 (L) 12/20/2017     Lab Results   Component Value Date    FERRITIN 177 07/28/2014     No results found for: PTH  No results found for: 69396  No results found for: 7597  Lab Results   Component Value Date    TSH 2.22 10/25/2016     No results found for: TESTOSTERONE    DIETARY HISTORY    Positive Changes Since Last Visit: cutting back on fast food and bread. Still more than useful for weight loss.  Struggling With: stress/smoking/exercise     Knowledgeable in [employer] Food Labels: improving  Getting Adequate Protein: improving  Sleeping 7-8 hours/day na  Stress management see above    PHYSICAL ACTIVITY PATTERNS:  Cardiovascular: walking  Strength Training: na    REVIEW OF SYSTEMS  GENERAL/CONSTITUTIONAL:  No issues  HEENT:   na  CARDIOVASCULAR:   no pain/palps w/ phentermine  PULMONARY:   no REYES  GASTROINTESTINAL:  No pain  UROLOGIC:  No issues  NEUROLOGIC:  No headaches  PSYCHIATRIC:  Stress likely related to relapse of smoking  MUSCULOSKELETAL/RHEUMATOLOGIC   na  ENDOCRINE:  na  DERMATOLOGIC:  na    PHYSICAL EXAM:  Vitals: /82 (Patient Site: Right Arm, Patient Position: Sitting, Cuff Size: Adult Large)  Pulse 95  Ht 6' 2\" (1.88 m)  Wt (!) 354 lb 3.2 oz (160.7 kg)  SpO2 96%  BMI 45.48 kg/m2  Height: 6' 2\" (1.88 m) (10/31/2018  3:05 PM)  Initial Weight: 367 lbs (9/27/2018  8:00 AM)  Weight: 354 lb 3.2 oz (160.7 kg) (10/31/2018  3:05 PM)  Weight loss from initial: 9 (9/27/2018  8:00 AM)  % Weight loss: 2.45 % (9/27/2018  8:00 AM)  BMI (Calculated): 45.5 (10/31/2018  3:05 PM)  SpO2: 96 % (10/31/2018  3:05 PM)  Heart Rate (/min): 89 (2/9/2018  2:00 PM)  BP (mmHg): 155/76 (2/9/2018  2:00 PM)  Waist Circumference (In): 58 Inches (12/20/2017  1:42 PM)  Hip Circumference (In): 54 Inches (12/20/2017  1:42 PM)  Neck Circumference (In): 20 Inches (12/20/2017  1:42 PM)    GEN: Pleasant, well groomed, in no acute distress  HEENT: PEERL, EOMI, airway clear .  NECK: No " swelling.  HEART: Rhythm regular, rate regular, no murmur   LUNGS: Clear without crackles or wheezes. No cough.  ABDOMEN: obese.  EXTREMITIES: 2 polus palpable peripheral pulses radial. No tremor..  NEURO: Alert and Oriented X3, normal gait and speech.  SKIN: No visible rashes.        25 minutes was spent in direct consultation, with over 50% of it spent in counseling regarding their plan for excess weight reduction and health modification.  Max Cameron MD  [facility]  3:30 PM

## 2021-06-23 NOTE — PROGRESS NOTES
"Bariatric Clinic Follow-Up Visit:    Marv Guy is a 33 y.o.  male with Body mass index is 44.01 kg/m .  presenting here today for follow-up on non-surgical efforts for weight loss. Original Intake visit occurred on 9/12/18 with a weight of 354 lbs and BMI of 45.5.  Along with diet and behavior changes, he has been using phentermine, half tablet to assist his weight loss goals.  See his intake visit notes for details on identified contributors to weight gain in the past.    Weight:   Wt Readings from Last 2 Encounters:   01/18/19 (!) 342 lb 12.8 oz (155.5 kg)   10/31/18 (!) 354 lb 3.2 oz (160.7 kg)    pounds  Height: 6' 2\" (1.88 m) (1/18/2019  1:45 PM)  Initial Weight: 367 lbs (9/27/2018  8:00 AM)  Weight: (!) 342 lb 12.8 oz (155.5 kg) (1/18/2019  1:45 PM)  Weight loss from initial: 9 (9/27/2018  8:00 AM)  % Weight loss: 2.45 % (9/27/2018  8:00 AM)  BMI (Calculated): 44 (1/18/2019  1:45 PM)  SpO2: 95 % (1/18/2019  1:45 PM)  Heart Rate (/min): 89 (2/9/2018  2:00 PM)  BP (mmHg): (!) 155/76 (2/9/2018  2:00 PM)      Comorbidities:  Patient Active Problem List   Diagnosis     JARROD on CPAP     Hepatic steatosis     Obesity, Class III, BMI 40-49.9 (morbid obesity) (H)       Current Outpatient Medications:      gabapentin (NEURONTIN) 300 MG capsule, Take 3 capsules by mouth at bedtime., Disp: , Rfl:      phentermine (ADIPEX-P) 37.5 mg tablet, Half tablet daily, Disp: 60 tablet, Rfl: 0     zolpidem (AMBIEN) 10 mg tablet, as needed., Disp: , Rfl: 1      Interim: Since our last visit, he has dropped 8 lbs, is prepping for first baby in March. Working out a little more. Tolerating phentermine. Using cpap for JARROD. Had to hold phentermine this month as had a cold and was more fatigued after holding cpap for a week or two due to some dry nose/bleeding issues.  He feels he could start lifting weights now at least 2 days weekly.    Plan:   1. Well done with 8 lb reduction in weight and quitting smoking.   2. Continue the " phentermine, half tablet daily when not ill.  3. Continue cpap.  4. Start the weights at least 2x weekly.  5. Continue vitamin D3 2000-4000IUs daily.  6. Hepatic steatosis: continue good weight loss/diet/exercise regimen.  Goal of getting weight at least under 300 lbs to limit risks of progressive disease/MOTA.        We discussed [facility]ariat[facility] Basics including:  -eating 3 meals daily  -eating protein first  -eating slowly, chewing food well  -avoiding/limiting calorie containing beverages  -We discussed the importance of restorative sleep and stress management in maintaining a healthy weight.  -We discussed the National Weight Control Registry healthy weight maintenance strategies and ways to optimize metabolism.  -We discussed the importance of physical activity including cardiovascular and strength training in maintaining a healthier weight and explored viable options.    Most recent labs:  Lab Results   Component Value Date    WBC 5.7 10/25/2016    HGB 16.4 10/25/2016    HCT 45.7 10/25/2016    MCV 91 10/25/2016     10/25/2016     Lab Results   Component Value Date    CHOL 189 05/09/2017     Lab Results   Component Value Date    HDL 40 05/09/2017     Lab Results   Component Value Date    LDLCALC 130 (H) 05/09/2017     Lab Results   Component Value Date    TRIG 97 05/09/2017     No components found for: CHOLHDL  Lab Results   Component Value Date    ALT 41 05/08/2018    AST 30 05/08/2018    ALKPHOS 57 05/08/2018    BILITOT 1.2 (H) 05/08/2018     Lab Results   Component Value Date    HGBA1C 5.4 12/20/2017     Lab Results   Component Value Date    OAPYKFGA25 591 12/20/2017     Lab Results   Component Value Date    BKGEUULO33KB 23.3 (L) 12/20/2017     Lab Results   Component Value Date    FERRITIN 177 07/28/2014     No results found for: PTH  No results found for: 77475  No results found for: 7597  Lab Results   Component Value Date    TSH 2.22 10/25/2016     No results found for:  "TESTOSTERONE    DIETARY HISTORY    Positive Changes Since Last Visit: see above  Struggling With: see above    Knowledgeable in [employer] Lab[employer]: improving, goals reviewed  Getting Adequate Protein: often   Sleeping 7-8 hours/day na  Stress management good    PHYSICAL ACTIVITY PATTERNS:  Cardiovascular: walking/  Strength Training: work    REVIEW OF SYSTEMS  GENERAL/CONSTITUTIONAL:  nl  HEENT:   nl  CARDIOVASCULAR:   nl  PULMONARY:   nl  GASTROINTESTINAL:  nl  UROLOGIC:  nl  NEUROLOGIC:  nl  PSYCHIATRIC:  nl  MUSCULOSKELETAL/RHEUMATOLOGIC   nl  ENDOCRINE:  nl  DERMATOLOGIC:  nl    PHYSICAL EXAM:  Vitals: /75 (Patient Site: Right Arm, Patient Position: Sitting, Cuff Size: Adult Large)   Pulse 87   Ht 6' 2\" (1.88 m)   Wt (!) 342 lb 12.8 oz (155.5 kg)   SpO2 95%   BMI 44.01 kg/m    Height: 6' 2\" (1.88 m) (1/18/2019  1:45 PM)  Initial Weight: 367 lbs (9/27/2018  8:00 AM)  Weight: (!) 342 lb 12.8 oz (155.5 kg) (1/18/2019  1:45 PM)  Weight loss from initial: 9 (9/27/2018  8:00 AM)  % Weight loss: 2.45 % (9/27/2018  8:00 AM)  BMI (Calculated): 44 (1/18/2019  1:45 PM)  SpO2: 95 % (1/18/2019  1:45 PM)  Heart Rate (/min): 89 (2/9/2018  2:00 PM)  BP (mmHg): (!) 155/76 (2/9/2018  2:00 PM)      GEN: Pleasant, well groomed, in no acute distress  HEENT: PEERL, EOMI, airway clear .  NECK: No swelling.  HEART: Rhythm regular, rate regular, no murmur   LUNGS: Clear without crackles or wheezes. No cough.  ABDOMEN: obese.  EXTREMITIES: 2 plus palpable peripheral pulses, radial. No edema.  NEURO: Alert and Oriented X3, normal gait and speech.  SKIN: No visible rashes..        25 minutes was spent in direct consultation, with over 50% of it spent in counseling regarding their plan for excess weight reduction and health modification.  Max Cameron MD  [facility]  2:29 PM    "

## 2021-06-23 NOTE — PATIENT INSTRUCTIONS - HE
"Plan:  1. Well done with 8 lb reduction in weight and quitting smoking.   2. Continue the phentermine, half tablet daily when not ill.  3. Continue cpap.  4. Start the weights at least 2x weekly.  5. Continue vitamin D3 2000-4000IUs daily.      Information about the Weight Loss Medication Phentermine    When combined with a commitment to diet and behavior changes, weight loss medications can be a nice additional tool to maximize your weight loss season.  There are no magic pills and without diet and behavior changes, weight loss will be minimal.  Think of this medication as a tool to make your diet and behavior changes easier and you'll enjoy a higher probability of success.  Remember not to skip meals, but use this medication to tolerate your reduced calories more easily.  If you are very hungry in the evenings, you are likely not eating enough in the first 10 hours of your day and need to focus on getting your protein requirements in at each of your 3 daily meals.      Phentermine is a stimulant medication related to the amphetamine class of medication but with a lower risk of dependence and addiction.  It is used for weight loss by suppressing the appetite region of the brain.  It also may speed up the metabolic rate and give a person more energy.  Like any medication there are potential side effects and the most common are:  Dry mouth occurs in almost everyone (hydrate well), fewer people experience Palpatations, fast heart rate, elevation of blood pressure, restlessness, insomnia, dizziness, change in mood, tremor, headache, changes in bowel movements,itchiness, changes in sex drive.    Some people can develop serious side effects which include:  Heart strain (\"ischemia\").  Tachycardia (fast heart rate or irregular heart rate).  Hypertension  Pulmonary Hypertension  Psychosis  Dependency and abuse has occurred in some.  If you've been on high dose (37.5mg) for long periods, phentermine should be tapered down " over a few weeks before abruptly stopping as seizures have been reported rarely.    We do not recommend taking it in combination with the following medications due to potential drug interactions which can increase the risk of side effects and/or potential for seizures:    Absolutely contraindicated are:  Amphetamines or other stimulants like ADHD medication: (dextroamphetamine, amphetamine, diethylpropion, isocarboxazid, methamphetamine, lisdexamfetamine, benzphetamine,dexmethylphenidate, methylphenidate, selegiline patch, sibutramine, tranylcypromine.    Avoid use with:   Dopamine, dobutamine, ephedra, ephedrine, epinephrine, isoproterenol, linezolid, norepinephrine, phenylephrine injection, venlafaxine (Effexor).    Monitor or modify dose with:  Acebutolol, atenolol, betaxolol, bisoprolol, carvedilol, droxidopa, esmolol, labetalol, magnesium citrate, metoprolol, nadolol, nebivolol, penbutolol, pindolol, propranolol, sotalol, timolol.    Caution with: armodafinil,betaxolol eye drops, brexpiprazole, bupropion, busulfan, caffeine, carteolol drops, enzalutaminde, ginseng, green tea, guarana, levobunolol drops, lindane cream, modafinil, afrin nasal spray (oxymetazoline), pamabrom, phenylephrine oral and nasal spray, pseudoephedrine (sudafed), rasgiline, sleegiline, bowel prep, tiagabine, timolol drops,  TRAMADOL due to increased risk of seizures.    The current cheapest place to fill your prescription is at [facility], [facility] or [facility] and is around $30 for 90 tablets.  Occasionally, [facility] and [facility] have price matched, so call around and get the best price for you.  Other pharmacies may charge closer to$70- $100 for the same prescription. You don't have to be a member to use the pharmacy at [facility] currently.  An alternative some patients have tried is using a voucher system through [employer].  $25 paid on their website gets you a  voucher that can allows you to pick your meds up at [facility] without paying anything  more.    Dosing:  We start with half a tablet for the first 2 weeks and if tolerating it without problems, you can take up to one full tablet daily in the morning after breakfast.  For those with evening hunger problems, sometimes half a tablet in the morning and half a tablet around 1-2 pm can be effective, however, risks of nighttime insomnia/restless increase with afternoon dosing so call me at the clinic if considering this regimen or having any issues.  You only have to use the amount effective for you, not to exceed one full tablet.  It can also be used situationaly and does not have to be taken every day. As always, if any questions give us a call at the [facility] telephone:  [phone].          On-the-Go Breakfast Ideas  As of 2015, the latest research shows what a huge impact eating breakfast has on losing weight and feeling your best. People lose more weight when they make breakfast their biggest meal of the day compared to Dinner, but even if you cannot go to that degree, getting a breakfast that has at least 20 grams of protein and even a moderate amount of fat is ideal for maintaining good energy through the day and limits overeating in the evening hours.  The following are some quick and easy suggestions for at least getting something of substance into your body in the morning.  Enjoy!    Eating breakfast within 90 minutes of waking up is an important part of taking care of your body.  After sleeping for hours, your body is in need of fuel.  An ideal breakfast is a combination of protein, whole-grain carbohydrates, or fruit.  Here s why:    -Protein digests very slowly in the body, helping you feel more satisfied.  -Whole grains provide dietary fiber, which also digests slowly and helps keep your gut clean.  -Fruit is a great source of vitamins, minerals, and fiber.      Each one of these breakfast combinations has between 200-300 calories and 15-20 grams of protein.  Feel  free to mix and match!    Protein: Choose  -1/2 cup low-fat cottage cheese  -2 hard boiled eggs , or one cooked in olive oil (low/slow heat).  -1 low fat string cheese stick  -1 Tablespoon natural peanut butter  -echoecho vegetarian sausage jade (found in freezer section)  -1 slice lowfat cheese  -6 oz 2% or lowfat Greek yogurt, such as Fage or Oikos.    PLUS    Whole Grains:  Choose   -1 whole wheat English muffin  -1 whole wheat veto, half  -1/2 Fiber One frozen muffin, thawed  -1/2 Fiber One toaster pastry  -1 whole wheat bagel thin  -1/2 cup Kashi cereal  -1 Kashi waffle (or other whole grain high-fiber waffle)    OR    Fruit: Choose  -1/3 cup blueberries  -1/2 banana (or a plantain- similar to a banana, yet smaller)  -1/2 cup cantaloupe cubes  -1 small apple  -1 small orange  -1/2 cup strawberries    *Adapted from [employer], Fall 20    Ten Breakfasts Under 250 calories    Ideally, getting between 350-600 calories  (depending on starting height and weight)for breakfast is ideal for avoiding hunger later in the day, adjust/add to the following accordingly:    One- 250 calories, 8.5 g protein  1 slice whole-grain toast   1 Tbsp peanut butter    banana    Two- 250 calories, 8 g protein    cup nonfat/lowfat yogurt  1/3rd cup diced no-sugar peaches  1/3rd cup cereal (like Special K, Cheerios, or bran flakes)    Three- 250 calories, 25 g protein  1 egg scrambled with 1 oz skim milk    cup shredded cheddar    whole grain English muffin  1 oz Porter kellogg  1 tsp margarine spread    Four- 225 calories, 25 g protein  1/2 cup Kashi Go-Lean cereal    cup skim milk mixed with 1 scoop Bariatric Advantage protein powder    cup no-sugar diced pears    Five- 250 calories, 20 g protein    cup oatmeal prepared with skim milk, 1 scoop protein powder, and sugar-free maple syrup    Six- 200 calories, 5 g protein  1 whole grain waffle, toasted  1 tablespoon creamy peanut or almond butter    Seven-  250 calories, 19 g  protein  Breakfast sandwich: 1 slice whole grain toast, cut in half.  Add 1 scrambled egg and one slice cheddar  cheese.    Eight-  250 calories, 15 g protein  2 eggs scrambled with 1/3 cup frozen spinach (heat before adding to eggs) and 2 tablespoons low fat cream cheese.    Nine-  150 calories, 15 g protein  2/3rd cup cottage cheese    cup cantaloupe    Ten- 200 calories, 20 g protein  Fruit smoothie made with 4 oz. nonfat Greek yogurt,   cup berries, 1 scoop protein powder, and 4 oz skim milk.    Ten Lunches Under 250 Calories    Aim for lunch to be around 300-400 calories a day when trying to lose weight and get that protein in!    One- 200 calories, 11 g protein  1/3 cup tuna salad made with light ramirez on 1 slice whole grain bread  1 small peeled apple    Two- 250 calories, 16 g protein  1/3 cup lowfat cottage cheese    cup cooked green beans    small fruit cocktail (in natural juice)    Three- 200 calories, 11 g protein    grilled cheese sandwich on whole grain bread with lowfat cheese  2/3rd cup of tomato soup    Four- 250 calories, 22 g protein  Deli wrap: 1 oz sliced turkey, 1 oz sliced ham, 1 oz sliced chicken rolled up with 1 slice low-fat cheese  1 small orange    Five- 250 calories, 28 g protein  2/3rd cup chili with 1 oz shredded cheese  4 saltine crackers    Six- 250 calories, 22 g protein  1 cup fresh spinach with 2 oz chicken, 1/3rd cup mandarin oranges, and 2 tablespoons sliced almonds with 1 tablespoon light vinaigrette dressing    Seven- 200 calories, 11 g protein  1 Tbsp sugar-free preserves and 1 Tbsp peanut butter on 1 slice whole grain toast    cup nonfat/lowfat Greek yogurt    Eight- 250 calories, 18 g protein  1 small soft-shell chicken taco with 1 oz shredded cheese, lettuce, tomato, salsa, and 1 Tbsp light sour cream    cup black beans    Nine- 225 calories, 13 g protein  2 ounces baked chicken  1/4 cup mashed potatoes    cup green beans    Ten- 200 calories, 21 g protein  Deli veto: 2 oz  roast beef or other deli meat with 1 tsp Bronson mayonnaise and sliced tomato, onion, and lettuce  1/3rd cup cottage cheese      Ten Dinners Under 300 calories    If you're eating a large breakfast and medium lunch, keep dinner small.  300-400 calories is ideal for most people depending on their caloric needs.    One- 300 calories, 12 g protein  1-inch thick slice of turkey meatloaf    cup baked butternut squash    Two- 200 calories, 9 g protein  Bread-less BLT: 3 slices turkey kellogg, sliced tomato, wrapped in a large lettuce leaf    cup peeled fruit    Three- 275 calories, 36 g protein  3 oz roasted chicken    cup cooked broccoli    cup shredded cheddar cheese    cup unsweetened applesauce    Four- 200 calories, 25 g protein  3 oz baked tilapia  1/3rd cup cooked carrots    cup yogurt    Five- 250 calories, 20 g protein  Grilled ham  n  Swiss: spread 2 tsp light margarine on 1 slice of whole grain bread.  Cut bread in half, layer 2 oz deli ham with 1 piece of Swiss cheese and grill until cheese is melted.    cup cooked vegetables    Six- 250 calories, 18 g protein  Vegetarian cheeseburger: 1 [place] cheeseburger topped with lettuce, onion, tomato, and ketchup/mustard    cup sweet potato fries    Seven- 250 calories, 18 g protein  Pork pot roast: 2 oz roasted pork loin, 1/3rd cup roasted carrots,   medium potato, cooked with   cup gravy    Eight- 300 calories, 25 g protein  2 oz meatballs (about 2 small meatballs)    cup spaghetti sauce  1/2 piece toast topped with 1 tsp light margarine and topped with garlic powder, toasted in oven    Nine- 250 calories, 16 g protein  Mexican pizza: one 8  corn tortilla topped with 2 oz chicken,   cup salsa, 2 tablespoons black beans, 2 tablespoons shredded cheese.  Bake until cheese is melted.    Ten- 250 calories, 22 g protein  Shrimp stir-dodd: 3 oz cooked shrimp, 1/6th onion,   pepper,   cup chopped carrots sautéed in 1 tablespoon olive oil, topped with 2 tablespoons stir dodd sauce  and a pinch of sesame seeds      Example Meal Plan for a 2315-1977 Calorie Diet:    In order to fuel your weight loss properly and avoid hunger-induced overeating later in the day, you will enjoy the most success by following the diet below or similar with adjustments based on your particular tastes and preferences.      I recommend getting into a meal routine and keeping it similar day to day in the beginning so you don t have to think too hard about what you re going to make/eat.  Keep snacks healthy, ideally containing protein.  Non-processed food is preferable to packaged items.  Eat at least a few crunchy green vegetables at snack times, which should be 2-3 hours after your mealtimes(prepare these ahead of time).  Drink 64 oz -96 oz of water daily.  During a diet we often mistake thirst for hunger or just have some grazing habits we have to break ('bored/mindless eating') and a glass of water and reconsideration of our hunger is often all that is needed.      If you re having hunger problems, add a protein drink to your morning hours or afternoon snack with at least 20grams of protein and not too much sugar can limit this hunger.  If the urge to snack is overwhelming and not satiated, try going for a 10 minute walk/exercise, come home and drink a glass of water and if still hungry, have a 150 calorie snack (handful of raw/sprouted nuts, veggies and string cheese, protein bar, etc).      It is better to have a large breakfast, a moderate lunch and a smaller dinner to fuel your day.  People lose 10-15% more weight during their weight loss season with this strategy.    To make sure you re getting adequate vitamins and minerals during weight loss, I recommend one complete multivitamin a day of your choice.  Consider a probiotic and taking some vitamin D 2000 IU daily.      Example Meal Plan:  Breakfast: 450-475 Calories  1 egg cooked on low in olive oil:   calories.  5oz Greek Yogurt (Fage plain classic: ~150  renate)  Handful of Berries of your choice (about a calorie per berry or 20-40cal per handful)    cup(cooked) of  old fashioned oatmeal or 1/2 cup(cooked) steel cut oats. (150 renate)  Sprinkle amount of brown sugar and a pat of butter. (40 renate)  Glass of  Water  Black coffee or unsweetened Tea (0calories).      2-3 hours Later Snack: (195 calories).  Glass of water  One string Cheese (80 calories) or 4 oz creamed cottage cheese (115 calories) with  Crunchy Celery sticks (less than 10 calories per large stalk) 2 stalks. (20 calories)    of a  Large Banana or   of a Large Apple (60 calories):  eat second half at lunch or afternoon snack.     Lunch:300 -350 calories   Chicken Breast  (baked/broiled/roasted/grilled)  4-6 oz.  (125-180 renate), BBQ sauce/hot sauce/mustard/seasoning is free. Just use a reasonable amount. Or a can of tuna with 1 tablespoon mayonnaise.  Salad: lettuce, any other veggies (cucumbers, green peppers/celery you like and a small drizzle of dressing to just flavor.  Go as big on the veggies as you like,  as they are practically calorie free.   A whole, 8 inch cucumber is 45 calories, a whole green pepper is 23 calories, a stalk of celery is 9 calories.  [place] Dressing is 60 calories per tablespoon..so moderate your desired dressing or do a drizzle of olive oil and splash of balsamic vinegar on top,  Total calories unlikely to be over 150 even with dressing.  Glass of Water.    Option for lunch is meal replacement protein drink/smoothie.  Need at least 20 grams of protein and eat the rest of your apple/banana from the morning snack.      Afternoon Snack: 150-200 calories   Cheese Stick or cottage cheese again  and a fresh fruit OR  Granola Bar (protein Bar acceptable if under 200 calories OR  Homemade smoothies:  8oz skim milk,  a handful of berries (fresh or frozen and a serving of protein powder such as BiPro or Ella sWhey for example.  If you don't like dairy, make with 8oz water, one small  banana, handful of berries and the protein powder, add any veggies you want as well:  roughly 200 calories.   Glass of Water    Dinner: 325 calories  4oz of fresh, Atlantic salmon.  Broiled (salt/pepper/dill) for about 8-8.5 minutes (200calories) or  4oz filet mignon steak or sirloin steak  Salad or vegetable sautéed lightly in olive oil or   Broccoli 1.5  cups chopped and steamed  or micro-waved in a little water (75 calories)  Glass of Water,    Cup of herbal tea (unsweetened, caffeine free)      Herbs and seasonings are free and encouraged to flavor your foods/vegetables.  Make your food delicious.    Tips for Success:  1.  Prepare proteins ahead of time (broil chicken breasts in bulk so you can grab and go), steel cut oats can be stored in casserole dish/bowl in the fridge for quick scoop in the morning and rewarm in microwave, make use of crock pot recipes (watch salt content).  Making meals that cover 3-4 future meals is an easy way to stay on track.  2.  Drink a 8-12 oz glass of water every 2-3 hours when awake.  We often mistake hunger for thirst, especially when losing weight.  3. Remember your Reward and Motivation when things get hard.  4.  Weigh yourself every morning and record, you'll stay on track better and learn how our biorhythms, diet and elimination patterns show up on the scale. Don't worry about 1 or 2 day patterns, but when on track you'll notice good trend downward of weight over 3-4 day segments.  Plateaus tend to resolve after 4-8 days in most cases if you stay consistent with your plan.  These are natural and part of weight loss, even if you're perfect with your plan execution.  5. Call if problems/concerns.  [employer] is a great tool to stay in touch and provide weekly outside accountability.   6.  Find a handful of meals/foods that keep you on track and feeling good and get into a routine that is sustainable for you.  7.  Take a complete multivitamin just to make sure all micronutrients  "are adequate during weight loss.  8. If losing hair/brittle nails it usually means you are not taking enough protein.  Minimum goal is 60 grams daily of protein for smaller women, 80 grams a day for men. Consider taking Biotin as supplement or a \"Hair and Nail\" multivitamin.      "

## 2021-06-27 ENCOUNTER — HEALTH MAINTENANCE LETTER (OUTPATIENT)
Age: 36
End: 2021-06-27

## 2021-07-03 NOTE — ADDENDUM NOTE
Addendum Note by Brenda Alves, RN at 11/5/2018  2:34 PM     Author: Brenda Alves, RN Service: -- Author Type: Registered Nurse    Filed: 11/5/2018  2:34 PM Encounter Date: 10/31/2018 Status: Signed    : Brenda Alves RN (Registered Nurse)    Addended by: BRENDA ALVES on: 11/5/2018 02:34 PM        Modules accepted: Orders

## 2021-10-17 ENCOUNTER — HEALTH MAINTENANCE LETTER (OUTPATIENT)
Age: 36
End: 2021-10-17

## 2022-07-23 ENCOUNTER — HEALTH MAINTENANCE LETTER (OUTPATIENT)
Age: 37
End: 2022-07-23

## 2022-10-01 ENCOUNTER — HEALTH MAINTENANCE LETTER (OUTPATIENT)
Age: 37
End: 2022-10-01

## 2023-02-05 ENCOUNTER — HEALTH MAINTENANCE LETTER (OUTPATIENT)
Age: 38
End: 2023-02-05